# Patient Record
Sex: FEMALE | Race: WHITE | NOT HISPANIC OR LATINO | Employment: FULL TIME | URBAN - METROPOLITAN AREA
[De-identification: names, ages, dates, MRNs, and addresses within clinical notes are randomized per-mention and may not be internally consistent; named-entity substitution may affect disease eponyms.]

---

## 2017-01-13 ENCOUNTER — ALLSCRIPTS OFFICE VISIT (OUTPATIENT)
Dept: OTHER | Facility: OTHER | Age: 22
End: 2017-01-13

## 2018-01-12 VITALS
WEIGHT: 184.5 LBS | DIASTOLIC BLOOD PRESSURE: 72 MMHG | HEIGHT: 67 IN | BODY MASS INDEX: 28.96 KG/M2 | SYSTOLIC BLOOD PRESSURE: 100 MMHG | RESPIRATION RATE: 14 BRPM | HEART RATE: 72 BPM

## 2018-02-16 DIAGNOSIS — Z78.9 USES BIRTH CONTROL: Primary | ICD-10-CM

## 2018-02-16 RX ORDER — NORETHINDRONE ACETATE AND ETHINYL ESTRADIOL AND FERROUS FUMARATE 1MG-20(21)
KIT ORAL
Qty: 84 TABLET | Refills: 3 | Status: SHIPPED | OUTPATIENT
Start: 2018-02-16 | End: 2019-02-15 | Stop reason: SDUPTHER

## 2018-07-20 ENCOUNTER — OFFICE VISIT (OUTPATIENT)
Dept: FAMILY MEDICINE CLINIC | Facility: CLINIC | Age: 23
End: 2018-07-20
Payer: COMMERCIAL

## 2018-07-20 VITALS
BODY MASS INDEX: 31.14 KG/M2 | RESPIRATION RATE: 16 BRPM | HEART RATE: 60 BPM | TEMPERATURE: 98 F | DIASTOLIC BLOOD PRESSURE: 72 MMHG | SYSTOLIC BLOOD PRESSURE: 110 MMHG | HEIGHT: 67 IN | WEIGHT: 198.4 LBS

## 2018-07-20 DIAGNOSIS — R30.0 DYSURIA: Primary | ICD-10-CM

## 2018-07-20 DIAGNOSIS — N89.8 VAGINAL DISCHARGE: ICD-10-CM

## 2018-07-20 DIAGNOSIS — N76.0 ACUTE VAGINITIS: ICD-10-CM

## 2018-07-20 LAB
SL AMB  POCT GLUCOSE, UA: NORMAL
SL AMB LEUKOCYTE ESTERASE,UA: NORMAL
SL AMB POCT BILIRUBIN,UA: NORMAL
SL AMB POCT BLOOD,UA: NORMAL
SL AMB POCT CLARITY,UA: NORMAL
SL AMB POCT COLOR,UA: YELLOW
SL AMB POCT KETONES,UA: NORMAL
SL AMB POCT NITRITE,UA: NORMAL
SL AMB POCT PH,UA: 7.5
SL AMB POCT SPECIFIC GRAVITY,UA: 1.01
SL AMB POCT URINE PROTEIN: 30
SL AMB POCT UROBILINOGEN: 0.2

## 2018-07-20 PROCEDURE — 99213 OFFICE O/P EST LOW 20 MIN: CPT | Performed by: FAMILY MEDICINE

## 2018-07-20 PROCEDURE — 3008F BODY MASS INDEX DOCD: CPT | Performed by: FAMILY MEDICINE

## 2018-07-20 PROCEDURE — 81002 URINALYSIS NONAUTO W/O SCOPE: CPT | Performed by: FAMILY MEDICINE

## 2018-07-20 RX ORDER — NORGESTIMATE AND ETHINYL ESTRADIOL 7DAYSX3 28
1 KIT ORAL DAILY
COMMUNITY
Start: 2017-01-30 | End: 2018-07-20 | Stop reason: ALTCHOICE

## 2018-07-23 LAB
C TRACH RRNA SPEC QL NAA+PROBE: NOT DETECTED
N GONORRHOEA RRNA SPEC QL NAA+PROBE: NOT DETECTED

## 2018-08-09 ENCOUNTER — ANNUAL EXAM (OUTPATIENT)
Dept: FAMILY MEDICINE CLINIC | Facility: CLINIC | Age: 23
End: 2018-08-09
Payer: COMMERCIAL

## 2018-08-09 VITALS
WEIGHT: 197.4 LBS | SYSTOLIC BLOOD PRESSURE: 118 MMHG | BODY MASS INDEX: 30.98 KG/M2 | DIASTOLIC BLOOD PRESSURE: 75 MMHG | HEIGHT: 67 IN | RESPIRATION RATE: 17 BRPM | HEART RATE: 69 BPM

## 2018-08-09 DIAGNOSIS — Z01.419 ENCOUNTER FOR GYNECOLOGICAL EXAMINATION WITH PAPANICOLAOU SMEAR OF CERVIX: Primary | ICD-10-CM

## 2018-08-09 PROCEDURE — 99395 PREV VISIT EST AGE 18-39: CPT | Performed by: PHYSICIAN ASSISTANT

## 2018-08-09 NOTE — PROGRESS NOTES
Assessment /Plan     1  Encounter for gynecological examination with Papanicolaou smear of cervix    - PAP done if normal will repeat in 3 years, c/w OCP daily  Encouraged healthy diet, exercise, monthly SBE  - Thinprep Pap    F/u 1 year for physical or sooner if needed    Subjective    Chief Complaint   Patient presents with   Ash[facility] Gynecologic Exam          Raymond Arguello is a 26 y o  female who presents for annual exam     General Health: The patient's health since the last visit is described as good  She has regular dental visits  She denies vision problems  She denies hearing loss  Immunizations status: up to date  Lifestyle:  She consumes a diverse and healthy diet  She exercises regularly  She does not use tobacco, drugs  Social alcohol  Reproductive health: Menstrual history: LMP: last week July, on OCP, cycles are regular every 28 days, normal flow, Sexually active, contraception: OCP no complaints  Pregnancy History:      Review of Systems    Constitutional: No fever, no chills, feels well, no tiredness, no recent weight gain or weight loss  Eyes: No complaints of eye pain, no red eyes, no eyesight problems, no discharge, no dry eyes, no itching of eyes  ENT: no complaints of earache, no loss of hearing, no nose bleeds, no nasal discharge, no sore throat, no hoarseness  Cardiovascular: No complaints of slow heart rate, no fast heart rate, no chest pain, no palpitations, no leg claudication, no lower extremity edema  Respiratory: No complaints of shortness of breath, no wheezing, no cough, no SOB on exertion, no orthopnea, no PND  Gastrointestinal: No complaints of abdominal pain, no constipation, no nausea or vomiting, no diarrhea, no bloody stools  Genitourinary: No complaints of dysuria, no incontinence, no pelvic pain, no dysmenorrhea, no vaginal discharge or bleeding     Musculoskeletal: No complaints of arthralgias, no myalgias, no joint swelling or stiffness, no limb pain or swelling  Integumentary: No complaints of skin rash or lesions, no itching, no skin wounds, no breast pain or lump  Neurological: No complaints of headache, no confusion, no convulsions, no numbness, no dizziness or fainting, no tingling, no limb weakness, no difficulty walking  Psychiatric: Not suicidal, no sleep disturbance, no anxiety or depression, no change in personality, no emotional problems  Endocrine: No complaints of proptosis, no hot flashes, no muscle weakness, no deepening of the voice, no feelings of weakness  Hematologic/Lymphatic: No complaints of swollen glands, no swollen glands in the neck, does not bleed easily, does not bruise easily  History reviewed  No pertinent past medical history  History reviewed  No pertinent surgical history  Family History   Problem Relation Age of Onset    No Known Problems Mother     No Known Problems Father     Substance Abuse Neg Hx     Mental illness Neg Hx     Alcohol abuse Neg Hx      Social History     Physical Exam    Vitals:    08/09/18 0958   BP: 118/75   BP Location: Left arm   Patient Position: Sitting   Cuff Size: Standard   Pulse: 69   Resp: 17   Weight: 89 5 kg (197 lb 6 4 oz)   Height: 5' 7 25" (1 708 m)       Constitutional   General appearance: No acute distress, well appearing and well nourished  Head and Face   Head and face: Normal     Neck   Neck: Supple, symmetric, trachea midline, no masses  Thyroid: Normal, no thyromegaly  Pulmonary   Respiratory effort: No increased work of breathing or signs of respiratory distress  Palpation of chest: Normal     Auscultation of lungs: Clear to auscultation  Cardiovascular   Palpation of heart: Normal PMI, no thrills  Auscultation of heart: Normal rate and rhythm, normal S1 and S2, no murmurs  Pedal pulses: 2+ bilaterally  Examination of extremities for edema and/or varicosities: Normal     Chest   Breasts: Normal, no dimpling or skin changes appreciated  Palpation of breasts and axillae: Normal, no masses palpated  Chest: Normal     Abdomen   Abdomen: Non-tender, no masses  Liver and spleen: No hepatomegaly or splenomegaly  Genitourinary   External genitalia and vagina: Normal, no lesions appreciated  Cervix: Normal, no lesions, Pap obtained  Uterus: Normal size, no tenderness, no masses  Adnexa/Parametria: Normal, no masses or tenderness  Lymphatic   Palpation of lymph nodes in neck: No lymphadenopathy  Palpation of lymph nodes in axillae: No lymphadenopathy  Palpation of lymph nodes in groin: No lymphadenopathy  Musculoskeletal   Gait and station: Normal     Skin   Skin and subcutaneous tissue: Normal without rashes or lesions  Palpation of skin and subcutaneous tissue: Normal turgor  Neurologic   Cranial nerves: Cranial nerves II-XII intact  Reflexes: 2+ and symmetric      Psychiatric   Judgment and insight: Normal     Mood and affect: Normal

## 2018-08-15 LAB
CLINICAL INFO: NORMAL
DATE PREVIOUS BX: NORMAL
LMP START DATE: NORMAL
SL AMB PREV. PAP:: NORMAL
SPECIMEN SOURCE CVX/VAG CYTO: NORMAL

## 2019-02-15 DIAGNOSIS — Z78.9 USES BIRTH CONTROL: ICD-10-CM

## 2019-02-15 RX ORDER — NORETHINDRONE ACETATE AND ETHINYL ESTRADIOL AND FERROUS FUMARATE 1MG-20(21)
KIT ORAL
Qty: 84 TABLET | Refills: 3 | Status: SHIPPED | OUTPATIENT
Start: 2019-02-15 | End: 2019-10-01 | Stop reason: ALTCHOICE

## 2019-02-18 ENCOUNTER — APPOINTMENT (OUTPATIENT)
Dept: URGENT CARE | Facility: CLINIC | Age: 24
End: 2019-02-18

## 2019-02-18 DIAGNOSIS — Z02.1 PHYSICAL EXAM, PRE-EMPLOYMENT: Primary | ICD-10-CM

## 2019-02-18 LAB — RUBV IGG SERPL IA-ACNC: 5.8 IU/ML

## 2019-02-18 PROCEDURE — 86765 RUBEOLA ANTIBODY: CPT | Performed by: PHYSICIAN ASSISTANT

## 2019-02-18 PROCEDURE — 86762 RUBELLA ANTIBODY: CPT | Performed by: PHYSICIAN ASSISTANT

## 2019-02-18 PROCEDURE — 86480 TB TEST CELL IMMUN MEASURE: CPT | Performed by: PHYSICIAN ASSISTANT

## 2019-02-18 PROCEDURE — 86735 MUMPS ANTIBODY: CPT | Performed by: PHYSICIAN ASSISTANT

## 2019-02-18 PROCEDURE — 86787 VARICELLA-ZOSTER ANTIBODY: CPT | Performed by: PHYSICIAN ASSISTANT

## 2019-02-19 LAB
MEV IGG SER QL: NORMAL
MUV IGG SER QL: NORMAL
VZV IGG SER IA-ACNC: NORMAL

## 2019-02-20 LAB
GAMMA INTERFERON BACKGROUND BLD IA-ACNC: 0.07 IU/ML
M TB IFN-G BLD-IMP: NEGATIVE
M TB IFN-G CD4+ BCKGRND COR BLD-ACNC: -0.01 IU/ML
M TB IFN-G CD4+ BCKGRND COR BLD-ACNC: -0.03 IU/ML
MITOGEN IGNF BCKGRD COR BLD-ACNC: >10 IU/ML

## 2019-07-08 RX ORDER — TRIAMCINOLONE ACETONIDE 1 MG/G
CREAM TOPICAL
Refills: 1 | COMMUNITY
Start: 2019-05-15 | End: 2019-07-10 | Stop reason: ALTCHOICE

## 2019-07-10 ENCOUNTER — OFFICE VISIT (OUTPATIENT)
Dept: FAMILY MEDICINE CLINIC | Facility: CLINIC | Age: 24
End: 2019-07-10
Payer: COMMERCIAL

## 2019-07-10 VITALS
WEIGHT: 200.1 LBS | HEIGHT: 67 IN | SYSTOLIC BLOOD PRESSURE: 114 MMHG | HEART RATE: 84 BPM | DIASTOLIC BLOOD PRESSURE: 76 MMHG | BODY MASS INDEX: 31.4 KG/M2 | RESPIRATION RATE: 15 BRPM

## 2019-07-10 DIAGNOSIS — Z30.09 BIRTH CONTROL COUNSELING: ICD-10-CM

## 2019-07-10 DIAGNOSIS — M25.552 PAIN OF BOTH HIP JOINTS: Primary | ICD-10-CM

## 2019-07-10 DIAGNOSIS — M25.551 PAIN OF BOTH HIP JOINTS: Primary | ICD-10-CM

## 2019-07-10 PROCEDURE — 99213 OFFICE O/P EST LOW 20 MIN: CPT | Performed by: PHYSICIAN ASSISTANT

## 2019-07-10 NOTE — PATIENT INSTRUCTIONS
Obesity   AMBULATORY CARE:   Obesity  is when your body mass index (BMI) is greater than 30  Your healthcare provider will use your height and weight to measure your BMI  The risks of obesity include  many health problems, such as injuries or physical disability  You may need tests to check for the following:  · Diabetes     · High blood pressure or high cholesterol     · Heart disease     · Gallbladder or liver disease     · Cancer of the colon, breast, prostate, liver, or kidney     · Sleep apnea     · Arthritis or gout  Seek care immediately if:   · You have a severe headache, confusion, or difficulty speaking  · You have weakness on one side of your body  · You have chest pain, sweating, or shortness of breath  Contact your healthcare provider if:   · You have symptoms of gallbladder or liver disease, such as pain in your upper abdomen  · You have knee or hip pain and discomfort while walking  · You have symptoms of diabetes, such as intense hunger and thirst, and frequent urination  · You have symptoms of sleep apnea, such as snoring or daytime sleepiness  · You have questions or concerns about your condition or care  Treatment for obesity  focuses on helping you lose weight to improve your health  Even a small decrease in BMI can reduce the risk for many health problems  Your healthcare provider will help you set a weight-loss goal   · Lifestyle changes  are the first step in treating obesity  These include making healthy food choices and getting regular physical activity  Your healthcare provider may suggest a weight-loss program that involves coaching, education, and therapy  · Medicine  may help you lose weight when it is used with a healthy diet and physical activity  · Surgery  can help you lose weight if you are very obese and have other health problems  There are several types of weight-loss surgery  Ask your healthcare provider for more information    Be successful losing weight:   · Set small, realistic goals  An example of a small goal is to walk for 20 minutes 5 days a week  Anther goal is to lose 5% of your body weight  · Tell friends, family members, and coworkers about your goals  and ask for their support  Ask a friend to lose weight with you, or join a weight-loss support group  · Identify foods or triggers that may cause you to overeat , and find ways to avoid them  Remove tempting high-calorie foods from your home and workplace  Place a bowl of fresh fruit on your kitchen counter  If stress causes you to eat, then find other ways to cope with stress  · Keep a diary to track what you eat and drink  Also write down how many minutes of physical activity you do each day  Weigh yourself once a week and record it in your diary  Eating changes: You will need to eat 500 to 1,000 fewer calories each day than you currently eat to lose 1 to 2 pounds a week  The following changes will help you cut calories:  · Eat smaller portions  Use small plates, no larger than 9 inches in diameter  Fill your plate half full of fruits and vegetables  Measure your food using measuring cups until you know what a serving size looks like  · Eat 3 meals and 1 or 2 snacks each day  Plan your meals in advance  Lorena Calvert and eat at home most of the time  Eat slowly  · Eat fruits and vegetables at every meal   They are low in calories and high in fiber, which makes you feel full  Do not add butter, margarine, or cream sauce to vegetables  Use herbs to season steamed vegetables  · Eat less fat and fewer fried foods  Eat more baked or grilled chicken and fish  These protein sources are lower in calories and fat than red meat  Limit fast food  Dress your salads with olive oil and vinegar instead of bottled dressing  · Limit the amount of sugar you eat  Do not drink sugary beverages  Limit alcohol  Activity changes:  Physical activity is good for your body in many ways   It helps you burn calories and build strong muscles  It decreases stress and depression, and improves your mood  It can also help you sleep better  Talk to your healthcare provider before you begin an exercise program   · Exercise for at least 30 minutes 5 days a week  Start slowly  Set aside time each day for physical activity that you enjoy and that is convenient for you  It is best to do both weight training and an activity that increases your heart rate, such as walking, bicycling, or swimming  · Find ways to be more active  Do yard work and housecleaning  Walk up the stairs instead of using elevators  Spend your leisure time going to events that require walking, such as outdoor festivals or fairs  This extra physical activity can help you lose weight and keep it off  Follow up with your healthcare provider as directed: You may need to meet with a dietitian  Write down your questions so you remember to ask them during your visits  © 2017 [employer] Information is for End User's use only and may not be sold, redistributed or otherwise used for commercial purposes  All illustrations and images included in CareNotes® are the copyrighted property of [employer]  or [employer]  The above information is an  only  It is not intended as medical advice for individual conditions or treatments  Talk to your doctor, nurse or pharmacist before following any medical regimen to see if it is safe and effective for you

## 2019-07-10 NOTE — PROGRESS NOTES
Assessment/Plan:    1  Pain of both hip joints    - refer to [facility] Dr Hannah Roe for eval and treatment  - Ambulatory referral to Orthopedic Surgery; Future    2  Birth control counseling    - refer to Dr Caleb Barboza to discuss nonhormonal IUD  - Ambulatory referral to Obstetrics / Gynecology; Future    3  Obesity    - encouraged patient to work on [employer], exercise  and adequate sleep for weight loss  Follow up if more help is needed      F/u as needed    Subjective:   Chief Complaint   Patient presents with    Contraception      Patient ID: Paloma Ortiz is a 22 y o  female  Patient here c/o left hip pain getting worse, while at work notes they are constant clicking off and on but most recently the L felt like it dislocated with sharp  If patient is working all day hips will be so sore at night pain with sitting  Has had pain for 3 months, soreness about a year  No trauma, no change in exercise  Aleve does help  Patient notes for the past year on her OCP has been "blue"  Notes the three weeks she takes hormones is blue and when off the week for her period "life it good"  Feels this happened with her last pill and needed a change  Would liek to discuss options  Is sexually active, monogamous relationship with one male  The following portions of the patient's history were reviewed and updated as appropriate: allergies, current medications, past family history, past medical history, past social history, past surgical history and problem list     History reviewed  No pertinent past medical history  History reviewed  No pertinent surgical history    Family History   Problem Relation Age of Onset    No Known Problems Mother     No Known Problems Father     Substance Abuse Neg Hx     Mental illness Neg Hx     Alcohol abuse Neg Hx      Social History     Socioeconomic History    Marital status: Single     Spouse name: Not on file    Number of children: Not on file    Years of education: Not on file  Highest education level: Not on file   Occupational History    Not on file   Social Needs    Financial resource strain: Not on file    Food insecurity:     Worry: Not on file     Inability: Not on file    Transportation needs:     Medical: Not on file     Non-medical: Not on file   Tobacco Use    Smoking status: Never Smoker    Smokeless tobacco: Never Used   Substance and Sexual Activity    Alcohol use: No    Drug use: No    Sexual activity: Not on file   Lifestyle    Physical activity:     Days per week: Not on file     Minutes per session: Not on file    Stress: Not on file   Relationships    Social connections:     Talks on phone: Not on file     Gets together: Not on file     Attends [employer] service: Not on file     Active member of club or organization: Not on file     Attends meetings of clubs or organizations: Not on file     Relationship status: Not on file    Intimate partner violence:     Fear of current or ex partner: Not on file     Emotionally abused: Not on file     Physically abused: Not on file     Forced sexual activity: Not on file   Other Topics Concern    Not on file   Social History Narrative    Not on file       Current Outpatient Medications:     JUNEL FE 1/20 1-20 MG-MCG per tablet, TAKE 1 TABLET DAILY AS DIRECTED , Disp: 84 tablet, Rfl: 3    Review of Systems          Objective:    Vitals:    07/10/19 1056   BP: 114/76   BP Location: Left arm   Patient Position: Sitting   Cuff Size: Standard   Pulse: 84   Resp: 15   Weight: 90 8 kg (200 lb 1 6 oz)   Height: 5' 7 25" (1 708 m)        Physical Exam   Constitutional: She is oriented to person, place, and time  She appears well-developed and well-nourished  Cardiovascular: Normal rate, regular rhythm and normal heart sounds  Pulmonary/Chest: Effort normal and breath sounds normal    Neurological: She is alert and oriented to person, place, and time  Skin: Skin is warm  Psychiatric: She has a normal mood and affect  BMI Counseling: Body mass index is 31 11 kg/m²  Discussed the patient's BMI with her  The BMI is above average  BMI counseling and education was provided to the patient  Nutrition recommendations include reducing portion sizes, decreasing overall calorie intake, 3-5 servings of fruits/vegetables daily and reducing fast food intake

## 2019-07-16 ENCOUNTER — TELEPHONE (OUTPATIENT)
Dept: OBGYN CLINIC | Facility: CLINIC | Age: 24
End: 2019-07-16

## 2019-07-16 ENCOUNTER — OFFICE VISIT (OUTPATIENT)
Dept: OBGYN CLINIC | Facility: CLINIC | Age: 24
End: 2019-07-16
Payer: COMMERCIAL

## 2019-07-16 VITALS
HEIGHT: 67 IN | DIASTOLIC BLOOD PRESSURE: 72 MMHG | WEIGHT: 197.2 LBS | BODY MASS INDEX: 30.95 KG/M2 | SYSTOLIC BLOOD PRESSURE: 112 MMHG

## 2019-07-16 DIAGNOSIS — Z11.3 SCREEN FOR STD (SEXUALLY TRANSMITTED DISEASE): ICD-10-CM

## 2019-07-16 DIAGNOSIS — Z30.014 ENCOUNTER FOR INITIAL PRESCRIPTION OF INTRAUTERINE CONTRACEPTIVE DEVICE (IUD): Primary | ICD-10-CM

## 2019-07-16 PROCEDURE — 87591 N.GONORRHOEAE DNA AMP PROB: CPT | Performed by: OBSTETRICS & GYNECOLOGY

## 2019-07-16 PROCEDURE — 99203 OFFICE O/P NEW LOW 30 MIN: CPT | Performed by: OBSTETRICS & GYNECOLOGY

## 2019-07-16 PROCEDURE — 87491 CHLMYD TRACH DNA AMP PROBE: CPT | Performed by: OBSTETRICS & GYNECOLOGY

## 2019-07-16 NOTE — PROGRESS NOTES
Pt is here to discuss switching from OCPs to an IUD  She feels her OCPs are contributing to mood swings and would like to stop taking them

## 2019-07-17 ENCOUNTER — TELEPHONE (OUTPATIENT)
Dept: OBGYN CLINIC | Facility: CLINIC | Age: 24
End: 2019-07-17

## 2019-07-17 LAB
C TRACH DNA SPEC QL NAA+PROBE: NEGATIVE
N GONORRHOEA DNA SPEC QL NAA+PROBE: NEGATIVE

## 2019-07-17 NOTE — TELEPHONE ENCOUNTER
Alonso and gregg [facility] labeled and will be brought to CV office  Pt can be scheduled for insertion

## 2019-07-17 NOTE — PROGRESS NOTES
Assessment     22 y o , starting IUD, no contraindications  Time spent counseling greater than 30 minutes    Plan: We reviewed all available birth control besides OCP  Given PMDD-type symptoms only on OCP I feel an IUD is her best option  We discussed Kyleena vs  Paragard  Pregnancy, infection, mechanical complications, and side effects of both IUD's reviewed  GC/chlamydia collected from urine  Pamphlets given    Subjective      Adelina Priest is a 22 y o  female who presents for contraception counseling  The patient has no complaints today  The patient is sexually active  She has been on OCP's for 6 years and has been experiencing persistent problems with PMDD-type symptoms  No BTB  Denies history of migraines, VTE, liver disease  She is not a smoker  Menstrual History:  OB History        0    Para   0    Term   0       0    AB   0    Living   0       SAB   0    TAB   0    Ectopic   0    Multiple   0    Live Births   0                  Patient's last menstrual period was 2019 (exact date)  Period Cycle (Days): 27  Period Duration (Days): 4  Period Pattern: Regular  Menstrual Flow: Moderate  Dysmenorrhea: (!) Mild  Dysmenorrhea Symptoms: Cramping    History reviewed  No pertinent past medical history  History reviewed  No pertinent surgical history        Current Outpatient Medications:     JUNEL FE 1/20 1-20 MG-MCG per tablet, TAKE 1 TABLET DAILY AS DIRECTED , Disp: 84 tablet, Rfl: 3    Allergies   Allergen Reactions    Pollen Extract        Social History     Tobacco Use    Smoking status: Never Smoker    Smokeless tobacco: Never Used   Substance Use Topics    Alcohol use: Yes     Frequency: Monthly or less    Drug use: No       Family History   Problem Relation Age of Onset    No Known Problems Mother     No Known Problems Father     Substance Abuse Neg Hx     Mental illness Neg Hx     Alcohol abuse Neg Hx      Review of Systems   Constitution: Positive for weight gain  Negative for decreased appetite, malaise/fatigue and night sweats  Gastrointestinal: Negative for bloating, abdominal pain, nausea and vomiting  Genitourinary: Negative for frequency, menorrhagia, missed menses and pelvic pain  Neurological: Negative for dizziness, headaches, light-headedness and seizures  Objective      /72 (BP Location: Right arm, Patient Position: Sitting, Cuff Size: Large)   Ht 5' 7" (1 702 m)   Wt 89 4 kg (197 lb 3 2 oz)   LMP 06/19/2019 (Exact Date)   Breastfeeding? No   BMI 30 89 kg/m²     Physical Exam   Constitutional: She is oriented to person, place, and time  She appears well-developed and well-nourished  HENT:   Head: Normocephalic  Cardiovascular: Normal rate  Pulmonary/Chest: Effort normal    Musculoskeletal: She exhibits no edema  Neurological: She is alert and oriented to person, place, and time  Skin: Skin is warm and dry  Psychiatric: She has a normal mood and affect  Vitals reviewed

## 2019-08-20 ENCOUNTER — OFFICE VISIT (OUTPATIENT)
Dept: OBGYN CLINIC | Facility: CLINIC | Age: 24
End: 2019-08-20
Payer: COMMERCIAL

## 2019-08-20 VITALS — DIASTOLIC BLOOD PRESSURE: 62 MMHG | SYSTOLIC BLOOD PRESSURE: 102 MMHG | BODY MASS INDEX: 31.42 KG/M2 | WEIGHT: 200.6 LBS

## 2019-08-20 DIAGNOSIS — Z32.02 PREGNANCY TEST NEGATIVE: ICD-10-CM

## 2019-08-20 DIAGNOSIS — Z30.430 ENCOUNTER FOR INSERTION OF INTRAUTERINE CONTRACEPTIVE DEVICE (IUD): Primary | ICD-10-CM

## 2019-08-20 LAB — SL AMB POCT URINE HCG: NORMAL

## 2019-08-20 PROCEDURE — 58300 INSERT INTRAUTERINE DEVICE: CPT | Performed by: OBSTETRICS & GYNECOLOGY

## 2019-08-20 PROCEDURE — 81025 URINE PREGNANCY TEST: CPT | Performed by: OBSTETRICS & GYNECOLOGY

## 2019-08-20 NOTE — PROGRESS NOTES
Iud insertions  Date/Time: 8/20/2019 7:41 AM  Performed by: George Schlatter, MD  Authorized by: George Schlatter, MD     Consent:     Consent obtained:  Written    Consent given by:  Patient    Procedure risks and benefits discussed: yes      Patient questions answered: yes      Patient agrees, verbalizes understanding, and wants to proceed: yes      Educational handouts given: yes      Instructions and paperwork completed: yes    Procedure:     Pelvic exam performed: yes      Negative GC/chlamydia test: yes      Negative urine pregnancy test: yes      Cervix cleaned and prepped: yes      Speculum placed in vagina: yes      Tenaculum applied to cervix: yes      Uterus sounded: yes      Uterus sound depth (cm):  8    IUD inserted with no complications: yes      IUD type: Emili Ashkan      Strings trimmed: yes    Post-procedure:     Patient tolerated procedure well: yes      Patient will follow up after next period: yes

## 2019-09-20 ENCOUNTER — EVALUATION (OUTPATIENT)
Dept: PHYSICAL THERAPY | Facility: CLINIC | Age: 24
End: 2019-09-20
Payer: COMMERCIAL

## 2019-09-20 ENCOUNTER — TRANSCRIBE ORDERS (OUTPATIENT)
Dept: PHYSICAL THERAPY | Facility: CLINIC | Age: 24
End: 2019-09-20

## 2019-09-20 DIAGNOSIS — M25.551 HIP PAIN, BILATERAL: Primary | ICD-10-CM

## 2019-09-20 DIAGNOSIS — M25.552 HIP PAIN, BILATERAL: Primary | ICD-10-CM

## 2019-09-20 PROCEDURE — 97161 PT EVAL LOW COMPLEX 20 MIN: CPT | Performed by: PHYSICAL THERAPIST

## 2019-09-20 NOTE — PROGRESS NOTES
PT Evaluation     Today's date: 2019  Patient name: Eddie Yap  : 1995  MRN: [id number]  Referring provider: Mackie Mortimer, DO  Dx:   Encounter Diagnosis     ICD-10-CM    1  Hip pain, bilateral M25 551     M25 552                   Assessment  Assessment details: Pt is a 22 y o  Female with a diagnosis of bilateral snapping hip  Clinical findings indicate poor balance and ambulation mechanics, tight iliopsoas, and global hip weakness  Pt would benefit from outpatient physical therapy to treat these impairments, and the below functional limitations  Impairments: abnormal coordination, abnormal movement, impaired balance, impaired physical strength, lacks appropriate home exercise program, pain with function and poor body mechanics  Functional limitations: pain with ambulating, pain with hills, pain with sitting long periods of time, pain with running  Goals  STG  1  [place] with HEP  2  Pt will decrease pain by 2 levels to improve quality of life  3  Pt will perform a negative Desmond test to indicate decreased iliopsoas tightness  LTG  1  Pt will tolerate running 10 mins without pain to reach pt goal of return to activity and PLOF  2  Pt will increase global hip strength to 5/5 to indicate increased stability surrounding the hip  3  Pt will obtain orthotics to improve body mechanics during ambulation  Plan  Patient would benefit from: skilled physical therapy  Planned therapy interventions: strengthening, stretching, therapeutic activities, therapeutic exercise, therapeutic training and home exercise program  Frequency: 2x week  Duration in weeks: 6  Treatment plan discussed with: patient        Subjective Evaluation    History of Present Illness  Mechanism of injury: Pt presents with snapping hip bilaterally  Snapping is both audible and felt by pt  Pain has been persisting for a few years, and unchanging  Pt referred from ortho   Pain inconsistently reproduced with walking and sitting for long periods of time, and ambulating hills  Pain alleviated with medication  Sleep is currently uninterrupted, but sometimes difficulty falling asleep due to pain  Pt attributes pain starting years ago due to increased walking at school with lots of hills  Xrays appear normal  No radiating pain present beyond the hip  Pt reports occasional knee pain associated with episodes of severe hip pain  Pain  Current pain ratin  At best pain ratin  At worst pain ratin  Location: Anterior hip bilaterally  Quality: dull ache and throbbing  Relieving factors: medications and rest  Aggravating factors: running, standing, walking and sitting  Progression: no change    Social Support    Employment status: working ()  Exercise history: running, spinning      Diagnostic Tests  X-ray: normal  Patient Goals  Patient goals for therapy: return to sport/leisure activities and decreased pain  Patient goal: return to running without pain        Objective     Static Posture     Knee   Genu valgus  Ankle/Foot   Ankle/Foot (Left): Pronated  Ankle/Foot (Right): Pronated  Palpation   Left   Tenderness of the iliopsoas  Right   Tenderness of the iliopsoas  Active Range of Motion   Left Hip   Flexion: WFL and with pain  Extension: WFL  Abduction: WFL    Right Hip   Flexion: WFL and with pain  Extension: WFL  Abduction: WFL    Passive Range of Motion   Left Hip   Flexion: WFL and with pain  Abduction: WFL  External rotation (90/90): WFL and with pain  Internal rotation (90/90): WFL    Right Hip   Flexion: WFL  Abduction: WFL  External rotation (90/90): WFL and with pain  Internal rotation (90/90): WFL  Left Knee   Normal passive range of motion    Right Knee   Normal passive range of motion    Additional Passive Range of Motion Details  No piriformis tightness present (normal ROM)    Joint Play   Left Hip   Joints within functional limits are the long axis distraction       Additional Joint Play Details  Long axis distraction closed pack position: WFL B/L    Strength/Myotome Testing     Left Hip   Planes of Motion   Flexion: 3+  Extension: 4+  Abduction: 4-  Adduction: WFL  External rotation: 4  Internal rotation: 4    Isolated Muscles   Gluteus stacey: 4+    Right Hip   Planes of Motion   Flexion: 3+  Extension: 4+  Abduction: 4-  Adduction: WFL  External rotation: 4  Internal rotation: 4    Isolated Muscles   Gluteus maximums: 4+    Left Knee   Flexion: 5  Extension: 5    Right Knee   Flexion: 5  Extension: 5    Left Ankle/Foot   Dorsiflexion: 5    Right Ankle/Foot   Dorsiflexion: 5    Additional Strength Details  Pain with ER B/L    Tests     Left Hip   Positive Ely's  Negative SUSIE, FADIR, modified Juan M, Juan M, piriformis, scour, SI compression and SI distraction  Desmond: Positive  90/90 SLR: Negative  Right Hip   Positive Ely's  Negative SUSIE, FADIR, modified Juan M, Juan M, piriformis, scour, SI compression and SI distraction  Desmond: Positive  90/90 SLR: Negative      Ambulation     Observational Gait   Base of support: increased    Additional Observational Gait Details  Knee valgus bilaterally     Functional Assessment        Comments  Forward step down B/L  -trunk lean  -knee collapse  -arm swing  -unsteady     Single Leg Stance: Poor balance             Precautions: none      Manual  9/20            [place] tail to B/L ant hip                                                                     Exercise Diary  9/20            Supine hip flex stretch  Demo            SLR 3 ways             Bridges             Clamshells/reverse             Quad hip ext (kb)             Quad hip abd             Prone quad stretch w/ strap             Step ups (f/l)             Step downs             Toe taps on cones             Step ups on bosu             Squats             upsidedown Bosu squats             SLS on airex             Standing hip flex tband at ankle             [employer] PT student supervised by Fazal Castillo DPT

## 2019-09-20 NOTE — LETTER
2019    [id number] [facility]    Patient: Morena Mcneal   YOB: 1995   Date of Visit: 2019     Encounter Diagnosis     ICD-10-CM    1  Hip pain, bilateral M25 551     M25 552        Dear Dr Ortez Barley: Thank you for your recent referral of Jorge Cook  Please review the attached evaluation summary from Adelina's recent visit  Please verify that you agree with the plan of care by signing the attached order  If you have any questions or concerns, please do not hesitate to call  I sincerely appreciate the opportunity to share in the care of one of your patients and hope to have another opportunity to work with you in the near future  Sincerely,    Roz Barajas, PT      Referring Provider:      I certify that I have read the below Plan of Care and certify the need for these services furnished under this plan of treatment while under my care  Daniel [address]: [phone]          PT Evaluation     Today's date: 2019  Patient name: Jorge Cook  : 1995  MRN: [id number]  Referring provider: Carmen Restrepo DO  Dx:   Encounter Diagnosis     ICD-10-CM    1  Hip pain, bilateral M25 551     M25 552                   Assessment  Assessment details: Pt is a 22 y o  Female with a diagnosis of bilateral snapping hip  Clinical findings indicate poor balance and ambulation mechanics, tight iliopsoas, and global hip weakness  Pt would benefit from outpatient physical therapy to treat these impairments, and the below functional limitations     Impairments: abnormal coordination, abnormal movement, impaired balance, impaired physical strength, lacks appropriate home exercise program, pain with function and poor body mechanics  Functional limitations: pain with ambulating, pain with hills, pain with sitting long periods of time, pain with running  Goals  STG  1  [place] with HEP  2  Pt will decrease pain by 2 levels to improve quality of life  3  Pt will perform a negative Desmond test to indicate decreased iliopsoas tightness  LTG  1  Pt will tolerate running 10 mins without pain to reach pt goal of return to activity and PLOF  2  Pt will increase global hip strength to 5/5 to indicate increased stability surrounding the hip  3  Pt will obtain orthotics to improve body mechanics during ambulation  Plan  Patient would benefit from: skilled physical therapy  Planned therapy interventions: strengthening, stretching, therapeutic activities, therapeutic exercise, therapeutic training and home exercise program  Frequency: 2x week  Duration in weeks: 6  Treatment plan discussed with: patient        Subjective Evaluation    History of Present Illness  Mechanism of injury: Pt presents with snapping hip bilaterally  Snapping is both audible and felt by pt  Pain has been persisting for a few years, and unchanging  Pt referred from ortho  Pain inconsistently reproduced with walking and sitting for long periods of time, and ambulating hills  Pain alleviated with medication  Sleep is currently uninterrupted, but sometimes difficulty falling asleep due to pain  Pt attributes pain starting years ago due to increased walking at school with lots of hills  Xrays appear normal  No radiating pain present beyond the hip  Pt reports occasional knee pain associated with episodes of severe hip pain     Pain  Current pain ratin  At best pain ratin  At worst pain ratin  Location: Anterior hip bilaterally  Quality: dull ache and throbbing  Relieving factors: medications and rest  Aggravating factors: running, standing, walking and sitting  Progression: no change    Social Support    Employment status: working (personal )  Exercise history: running, spinning      Diagnostic Tests  X-ray: normal  Patient Goals  Patient goals for therapy: return to sport/leisure activities and decreased pain  Patient goal: return to running without pain        Objective     Static Posture     Knee   Genu valgus  Ankle/Foot   Ankle/Foot (Left): Pronated  Ankle/Foot (Right): Pronated  Palpation   Left   Tenderness of the iliopsoas  Right   Tenderness of the iliopsoas  Active Range of Motion   Left Hip   Flexion: WFL and with pain  Extension: WFL  Abduction: WFL    Right Hip   Flexion: WFL and with pain  Extension: WFL  Abduction: WFL    Passive Range of Motion   Left Hip   Flexion: WFL and with pain  Abduction: WFL  External rotation (90/90): WFL and with pain  Internal rotation (90/90): WFL    Right Hip   Flexion: WFL  Abduction: WFL  External rotation (90/90): WFL and with pain  Internal rotation (90/90): WFL  Left Knee   Normal passive range of motion    Right Knee   Normal passive range of motion    Additional Passive Range of Motion Details  No piriformis tightness present (normal ROM)    Joint Play   Left Hip   Joints within functional limits are the long axis distraction  Additional Joint Play Details  Long axis distraction closed pack position: WFL B/L    Strength/Myotome Testing     Left Hip   Planes of Motion   Flexion: 3+  Extension: 4+  Abduction: 4-  Adduction: WFL  External rotation: 4  Internal rotation: 4    Isolated Muscles   Gluteus stacey: 4+    Right Hip   Planes of Motion   Flexion: 3+  Extension: 4+  Abduction: 4-  Adduction: WFL  External rotation: 4  Internal rotation: 4    Isolated Muscles   Gluteus maximums: 4+    Left Knee   Flexion: 5  Extension: 5    Right Knee   Flexion: 5  Extension: 5    Left Ankle/Foot   Dorsiflexion: 5    Right Ankle/Foot   Dorsiflexion: 5    Additional Strength Details  Pain with ER B/L    Tests     Left Hip   Positive Ely's     Negative MELINDA RICHARDS, bryan Cantu piriformis, scour, SI compression and SI distraction  Desmond: Positive  90/90 SLR: Negative  Right Hip   Positive Ely's  Negative SUSIE, FADIR, modified Juan M, Juan M, piriformis, scour, SI compression and SI distraction  Desmond: Positive  90/90 SLR: Negative      Ambulation     Observational Gait   Base of support: increased    Additional Observational Gait Details  Knee valgus bilaterally     Functional Assessment        Comments  Forward step down B/L  -trunk lean  -knee collapse  -arm swing  -unsteady     Single Leg Stance: Poor balance             Precautions: none      Manual  9/20            [place] tail to B/L ant hip                                                                     Exercise Diary  9/20            Supine hip flex stretch  Demo            SLR 3 ways             Bridges             Clamshells/reverse             Quad hip ext (kb)             Quad hip abd             Prone quad stretch w/ strap             Step ups (f/l)             Step downs             Toe taps on cones             Step ups on bosu             Squats             upsidedown Bosu squats             SLS on airex             Standing hip flex tband at ankle             Bike                                                                     PT student supervised by Nirav Tracy DPT

## 2019-09-23 ENCOUNTER — OFFICE VISIT (OUTPATIENT)
Dept: PHYSICAL THERAPY | Facility: CLINIC | Age: 24
End: 2019-09-23
Payer: COMMERCIAL

## 2019-09-23 DIAGNOSIS — M25.552 HIP PAIN, BILATERAL: Primary | ICD-10-CM

## 2019-09-23 DIAGNOSIS — M25.551 HIP PAIN, BILATERAL: Primary | ICD-10-CM

## 2019-09-23 PROCEDURE — 97110 THERAPEUTIC EXERCISES: CPT | Performed by: PHYSICAL THERAPIST

## 2019-09-23 PROCEDURE — 97530 THERAPEUTIC ACTIVITIES: CPT | Performed by: PHYSICAL THERAPIST

## 2019-09-23 NOTE — PROGRESS NOTES
Daily Note     Today's date: 2019  Patient name: Cesar Banda  : 1995  MRN: [id number]  Referring provider: Arcenio Toro DO  Dx:   Encounter Diagnosis     ICD-10-CM    1  Hip pain, bilateral M25 551     M25 552                   Subjective: Pt reports no increase in symptoms or pain levels since last session  Reports feeling a lot of stretch with HEP hip flexor stretch, but is tolerable  Objective: See treatment diary below      Assessment: Pt tolerated treatment well  Will progress next visit and add more functional movements  Plan: Continue per plan of care        Precautions: none      Manual             [place] tail to B/L ant hip  8'                                                                   Exercise Diary             Bike  8'           Supine hip flex stretch  Demo 3x30" ea           SLR 3 ways  x15           Bridges  x15           Clamshells/reverse  GTB x15 ea           Quad hip ext (kb)  x15           Quad hip abd  x15           Prone quad stretch w/ strap  3x30" ea           Step ups (f/l)             Step downs             Toe taps on cones  x15 ea           Step ups on bosu             Squats             upsidedown Bosu squats             SLS on airex             Standing hip flex tband at ankle  x15 red                                                                   PT student supervised by Mikal Duvall DPT

## 2019-09-26 ENCOUNTER — OFFICE VISIT (OUTPATIENT)
Dept: PHYSICAL THERAPY | Facility: CLINIC | Age: 24
End: 2019-09-26
Payer: COMMERCIAL

## 2019-09-26 DIAGNOSIS — M25.552 HIP PAIN, BILATERAL: Primary | ICD-10-CM

## 2019-09-26 DIAGNOSIS — M25.551 HIP PAIN, BILATERAL: Primary | ICD-10-CM

## 2019-09-26 PROCEDURE — 97110 THERAPEUTIC EXERCISES: CPT | Performed by: PHYSICAL THERAPIST

## 2019-09-26 PROCEDURE — 97530 THERAPEUTIC ACTIVITIES: CPT | Performed by: PHYSICAL THERAPIST

## 2019-09-26 NOTE — PROGRESS NOTES
Daily Note     Today's date: 2019  Patient name: Carrie Villalobos  : 1995  MRN: [id number]  Referring provider: Ara Peck DO  Dx:   Encounter Diagnosis     ICD-10-CM    1  Hip pain, bilateral M25 551     M25 552                   Subjective: Pt reports decreased pain after last session  Pt also reports, "I feel a little sore today from walking all day yesterday "      Objective: See treatment diary below      Assessment: Pt tolerated treatment well  Pt reports decreased pain by the end of treatment, and feeling muscle fatigue from exercises  Plan: Continue per plan of care        Precautions: none      Manual            [place] tail to B/L ant hip  8' 8'                                                                  Exercise Diary            Bike  8' 8'          Supine hip flex stretch  Demo 3x30" ea 3x30" ea          SLR 3 ways  x15 x15          Bridges  x15 x15          Clamshells/reverse  GTB x15 ea GTB x15          Quad hip ext (kb)  x15 x15           Quad hip abd  x15 x15          Prone quad stretch w/ strap  3x30" ea 3x30" ea           Step ups (f/l)             Step downs             Toe taps on cones  x15 ea x15 ea          Step ups on bosu             Squats over mat   x15          upsidedown Bosu squats             SLS on airex             Standing hip flex tband at ankle  x15 red x15 red                                                                  PT student supervised by Amy Madrid DPT

## 2019-09-30 ENCOUNTER — OFFICE VISIT (OUTPATIENT)
Dept: PHYSICAL THERAPY | Facility: CLINIC | Age: 24
End: 2019-09-30
Payer: COMMERCIAL

## 2019-09-30 ENCOUNTER — APPOINTMENT (OUTPATIENT)
Dept: PHYSICAL THERAPY | Facility: CLINIC | Age: 24
End: 2019-09-30
Payer: COMMERCIAL

## 2019-09-30 DIAGNOSIS — M25.552 HIP PAIN, BILATERAL: Primary | ICD-10-CM

## 2019-09-30 DIAGNOSIS — M25.551 HIP PAIN, BILATERAL: Primary | ICD-10-CM

## 2019-09-30 PROCEDURE — 97110 THERAPEUTIC EXERCISES: CPT

## 2019-09-30 NOTE — PROGRESS NOTES
Daily Note     Today's date: 2019  Patient name: Johnathan Vu  : 1995  MRN: [id number]  Referring provider: Manfred Young DO  Dx:   Encounter Diagnosis     ICD-10-CM    1  Hip pain, bilateral M25 551     M25 552                   Subjective: "My L hip has been a pain in the ass " Notes L hip pain level is 6-6 5/10, R hip "is fine today," adding, "it was a little sore" after working out at the gym  Objective: See treatment diary below      Assessment: Discomfort initially quad ABD ex on L, performed remaining exercises w/o complaint  Responded well to tiger tail STM to B hip flexors  Relief after tx  Will monitor  Plan: Continue per plan of care        Precautions: none      Manual           [place] tail to B/L ant hip  8' 8' 8'                                                                 Exercise Diary           Bike  8' 8' 8'         Supine hip flex stretch  Demo 3x30" ea 3x30" ea 3x30"  ea         SLR 3 ways  x15 x15 x15         [facility]  x15 x15 x15         Clamshells/reverse  GTB x15 ea GTB x15 GTB  x15         Quad hip ext (kb)  x15 x15  x15         Quad hip abd  x15 x15 x15         Prone quad stretch w/ strap  3x30" ea 3x30" ea  3x30"  ea         Step ups (f/l)             Step downs             Toe taps on cones  x15 ea x15 ea x15ea         Step ups on bosu             Squats over mat   x15 x15         upsidedown Bosu squats             SLS on airex             Standing hip flex tband at ankle  x15 red x15 red x15  red

## 2019-10-01 ENCOUNTER — APPOINTMENT (OUTPATIENT)
Dept: PHYSICAL THERAPY | Facility: CLINIC | Age: 24
End: 2019-10-01
Payer: COMMERCIAL

## 2019-10-01 ENCOUNTER — OFFICE VISIT (OUTPATIENT)
Dept: OBGYN CLINIC | Facility: CLINIC | Age: 24
End: 2019-10-01
Payer: COMMERCIAL

## 2019-10-01 VITALS — SYSTOLIC BLOOD PRESSURE: 118 MMHG | BODY MASS INDEX: 31.17 KG/M2 | WEIGHT: 199 LBS | DIASTOLIC BLOOD PRESSURE: 74 MMHG

## 2019-10-01 DIAGNOSIS — Z30.431 IUD CHECK UP: Primary | ICD-10-CM

## 2019-10-01 PROCEDURE — 99213 OFFICE O/P EST LOW 20 MIN: CPT | Performed by: OBSTETRICS & GYNECOLOGY

## 2019-10-01 NOTE — PROGRESS NOTES
GYN Follow Up Visit    HPI:  Patient is here for IUD string check up  Only light spotting  No problems during intercourse  PMH, PSH, Meds, Allergies, SocHx, FamHx reviewed and no changes noted    Review of Systems   Constitution: Negative for chills, decreased appetite, fever and malaise/fatigue  Gastrointestinal: Negative for abdominal pain, anorexia, change in bowel habit and nausea  Genitourinary: Negative for dysuria, frequency, hesitancy and pelvic pain  Neurological: Negative for dizziness, headaches, light-headedness and weakness  Vitals:    10/01/19 0913   BP: 118/74       Physical Exam   Constitutional: She is oriented to person, place, and time  She appears well-developed and well-nourished  Genitourinary: Vagina normal and uterus normal  Right adnexum does not display tenderness  Left adnexum does not display tenderness  Cervix exhibits visible IUD strings  Cervix does not exhibit discharge  Uterus is not tender  HENT:   Head: Normocephalic  Cardiovascular: Normal rate  Pulmonary/Chest: Effort normal    Abdominal: Soft  There is no tenderness  Musculoskeletal: She exhibits no edema  Neurological: She is alert and oriented to person, place, and time  Skin: Skin is warm and dry  Psychiatric: She has a normal mood and affect  Vitals reviewed      Impression:  Normal IUD check up    Plan:  Follow up @ annual exam

## 2019-10-09 ENCOUNTER — OFFICE VISIT (OUTPATIENT)
Dept: PHYSICAL THERAPY | Facility: CLINIC | Age: 24
End: 2019-10-09
Payer: COMMERCIAL

## 2019-10-09 DIAGNOSIS — M25.551 HIP PAIN, BILATERAL: Primary | ICD-10-CM

## 2019-10-09 DIAGNOSIS — M25.552 HIP PAIN, BILATERAL: Primary | ICD-10-CM

## 2019-10-09 PROCEDURE — 97110 THERAPEUTIC EXERCISES: CPT

## 2019-10-09 NOTE — PROGRESS NOTES
Daily Note     Today's date: 10/9/2019  Patient name: Brunilda Payment  : 1995  MRN: [id number]  Referring provider: Ruby Chin DO  Dx:   Encounter Diagnosis     ICD-10-CM    1  Hip pain, bilateral M25 551     M25 552                   Subjective: "I was away for a week, and the flights were painful " Notes L hip pain level is 2/10 upon arrival to therapy  Objective: See treatment diary below      Assessment: Performed exercise program w/o difficulty or discomfort  Cont to respond well to tiger tail STM to B hip flexors  Relief after tx  Will monitor  Plan: Continue per plan of care        Precautions: none      Manual   10        [place] tail to B/L ant hip  8' 8' 8' 8'                                                                Exercise Diary  9/20 9/23 9/26 9/30 10/9        Bike  8' 8' 8' 8'        Supine hip flex stretch  Demo 3x30" ea 3x30" ea 3x30"  ea 3x30"  ea        SLR 3 ways  x15 x15 x15 x15        Bridges  x15 x15 x15 x15        Clamshells/reverse  GTB x15 ea GTB x15 GTB  x15 GTB  x15        Quad hip ext (kb)  x15 x15  x15 x15        Quad hip abd  x15 x15 x15 x15        Prone quad stretch w/ strap  3x30" ea 3x30" ea  3x30"  ea 3x30"  ea        Step ups (f/l)     6"x15  ea        Step downs             Toe taps on cones  x15 ea x15 ea x15ea x15ea        Step ups on bosu             Squats over mat   x15 x15 NP        upsidedown Bosu squats             SLS on airex             Standing hip flex tband at ankle  x15 red x15 red x15  red x15  red

## 2019-10-11 ENCOUNTER — OFFICE VISIT (OUTPATIENT)
Dept: PHYSICAL THERAPY | Facility: CLINIC | Age: 24
End: 2019-10-11
Payer: COMMERCIAL

## 2019-10-11 DIAGNOSIS — M25.552 HIP PAIN, BILATERAL: Primary | ICD-10-CM

## 2019-10-11 DIAGNOSIS — M25.551 HIP PAIN, BILATERAL: Primary | ICD-10-CM

## 2019-10-11 PROCEDURE — 97140 MANUAL THERAPY 1/> REGIONS: CPT

## 2019-10-11 PROCEDURE — 97110 THERAPEUTIC EXERCISES: CPT

## 2019-10-11 NOTE — PROGRESS NOTES
Daily Note     Today's date: 10/11/2019  Patient name: Mohamud Clemente  : 1995  MRN: [id number]  Referring provider: Loni Garay DO  Dx:   Encounter Diagnosis     ICD-10-CM    1  Hip pain, bilateral M25 551     M25 552                   Subjective: "It didn't even hurt yesterday " Reports jet lag hit yesterday  Notes today "it's okay, but it's usually good in the morning "    Objective: See treatment diary below      Assessment: Performed exercise program w/o complaint  Demonstrates good knowledge of same  Responded well to tiger tail STM to B hip flexors  Relief after tx  Will monitor  Plan: Continue per plan of care        Precautions: none      Manual  9/20 9/23 9/26 9/30 10/9 10/11       [place] tail to B/L ant hip  8' 8' 8' 8' 8'                                                               Exercise Diary  9/20 9/23 9/26 9/30 10/9 10/11       Bike  8' 8' 8' 8' 8'       Supine hip flex stretch  Demo 3x30" ea 3x30" ea 3x30"  ea 3x30"  ea 3x30"  ea       SLR 3 ways  x15 x15 x15 x15 x15       Bridges  x15 x15 x15 x15 x15       Clamshells/reverse  GTB x15 ea GTB x15 GTB  x15 GTB  x15 GTB  x15       Quad hip ext (kb)  x15 x15  x15 x15 x15       Quad hip abd  x15 x15 x15 x15 x15       Prone quad stretch w/ strap  3x30" ea 3x30" ea  3x30"  ea 3x30"  ea 3x30"  ea       Step ups (f/l)     6"x15  ea 6"x15  ea       Step downs             Toe taps on cones  x15 ea x15 ea x15ea x15ea x15ea       Step ups on bosu             Squats over mat   x15 x15 NP x15       upsidedown Bosu squats             SLS on airex             Standing hip flex tband at ankle  x15 red x15 red x15  red x15  red x15  red

## 2019-10-18 ENCOUNTER — OFFICE VISIT (OUTPATIENT)
Dept: PHYSICAL THERAPY | Facility: CLINIC | Age: 24
End: 2019-10-18
Payer: COMMERCIAL

## 2019-10-18 DIAGNOSIS — M25.551 HIP PAIN, BILATERAL: Primary | ICD-10-CM

## 2019-10-18 DIAGNOSIS — M25.552 HIP PAIN, BILATERAL: Primary | ICD-10-CM

## 2019-10-18 PROCEDURE — 97140 MANUAL THERAPY 1/> REGIONS: CPT | Performed by: PHYSICAL THERAPIST

## 2019-10-18 PROCEDURE — 97110 THERAPEUTIC EXERCISES: CPT | Performed by: PHYSICAL THERAPIST

## 2019-10-18 PROCEDURE — 97112 NEUROMUSCULAR REEDUCATION: CPT | Performed by: PHYSICAL THERAPIST

## 2019-10-18 NOTE — PROGRESS NOTES
Daily Note     Today's date: 10/18/2019  Patient name: Martita Velez  : 1995  MRN: [id number]  Referring provider: Feliberto Viramontes DO  Dx:   Encounter Diagnosis     ICD-10-CM    1  Hip pain, bilateral M25 551     M25 552                   Subjective: Patient stated pain level 1/10 prior to treatment session  Objective: See treatment diary below      Assessment: Patient performed increased reps with exercises as listed without c/o pain; mild soreness with [place] tail to bilateral ant hips  Plan: Continue per plan of care  Precautions: none      Manual  9/20 9/23 9/26 9/30 10/9 10/11 10/18      [place] tail to B/L ant hip  8' 8' 8' 8' 8' 8'                                                              Exercise Diary  9/20 9/23 9/26 9/30 10/9 10/11 10/18      Bike  8' 8' 8' 8' 8' 8'      Supine hip flex stretch  Demo 3x30" ea 3x30" ea 3x30"  ea 3x30"  ea 3x30"  ea 3x30"  ea      SLR 3 ways  x15 x15 x15 x15 x15 x20      Bridges  x15 x15 x15 x15 x15 x20      Clamshells/reverse  GTB x15 ea GTB x15 GTB  x15 GTB  x15 GTB  x15 GTB x20      Quad hip ext (kb)  x15 x15  x15 x15 x15 x20      Quad hip abd  x15 x15 x15 x15 x15 x20      Prone quad stretch w/ strap  3x30" ea 3x30" ea  3x30"  ea 3x30"  ea 3x30"  ea 3x30"  ea      Step ups (f/l)     6"x15  ea 6"x15  ea 6" x 20 ea  Step downs             Toe taps on cones  x15 ea x15 ea x15ea x15ea x15ea x20 ea        Step ups on bosu             Squats over mat   x15 x15 NP x15 x20      upsidedown Bosu squats             SLS on airex             Standing hip flex tband at ankle  x15 red x15 red x15  red x15  red x15  red x20 red

## 2019-10-25 ENCOUNTER — OFFICE VISIT (OUTPATIENT)
Dept: PHYSICAL THERAPY | Facility: CLINIC | Age: 24
End: 2019-10-25
Payer: COMMERCIAL

## 2019-10-25 DIAGNOSIS — M25.551 HIP PAIN, BILATERAL: Primary | ICD-10-CM

## 2019-10-25 DIAGNOSIS — M25.552 HIP PAIN, BILATERAL: Primary | ICD-10-CM

## 2019-10-25 PROCEDURE — 97140 MANUAL THERAPY 1/> REGIONS: CPT

## 2019-10-25 PROCEDURE — 97112 NEUROMUSCULAR REEDUCATION: CPT

## 2019-10-25 PROCEDURE — 97110 THERAPEUTIC EXERCISES: CPT

## 2019-10-25 NOTE — PROGRESS NOTES
Daily Note     Today's date: 10/25/2019  Patient name: Cristiana Aviles  : 1995  MRN: [id number]  Referring provider: Quincy Bal DO  Dx:   Encounter Diagnosis     ICD-10-CM    1  Hip pain, bilateral M25 551     M25 552                   Subjective: "It's been okay," adding "the other day they were both hurting, and I took some Aleve " Notes "they seem to keep switching sides "     Objective: See treatment diary below      Assessment: Performed exercise program w/o difficulty or discomfort  [place] tail massage w/o complaint  Will monitor  Plan: Continue per plan of care  Precautions: none      Manual  9/20 9/23 9/26 9/30 10/9 10/11 10/18 10/25     [place] tail to B/L ant hip  8' 8' 8' 8' 8' 8' 8'                                                             Exercise Diary  9/20 9/23 9/26 9/30 10/9 10/11 10/18 10/25     Bike  8' 8' 8' 8' 8' 8' 8'     Supine hip flex stretch  Demo 3x30" ea 3x30" ea 3x30"  ea 3x30"  ea 3x30"  ea 3x30"  ea 3x30"  ea     SLR 3 ways  x15 x15 x15 x15 x15 x20 x20     Bridges  x15 x15 x15 x15 x15 x20 x20     Clamshells/reverse  GTB x15 ea GTB x15 GTB  x15 GTB  x15 GTB  x15 GTB x20 GTB  x20     Quad hip ext (kb)  x15 x15  x15 x15 x15 x20 x20     Quad hip abd  x15 x15 x15 x15 x15 x20 x20     Prone quad stretch w/ strap  3x30" ea 3x30" ea  3x30"  ea 3x30"  ea 3x30"  ea 3x30"  ea 3x30"  ea     Step ups (f/l)     6"x15  ea 6"x15  ea 6" x 20 ea  6"x  20ea     Step downs             Toe taps on cones  x15 ea x15 ea x15ea x15ea x15ea x20 ea   x20  ea     Step ups on bosu             Squats over mat   x15 x15 NP x15 x20 x20     upsidedown Bosu squats             SLS on airex             Standing hip flex tband at ankle  x15 red x15 red x15  red x15  red x15  red x20 red x20  red

## 2019-10-30 ENCOUNTER — OFFICE VISIT (OUTPATIENT)
Dept: PHYSICAL THERAPY | Facility: CLINIC | Age: 24
End: 2019-10-30
Payer: COMMERCIAL

## 2019-10-30 DIAGNOSIS — M25.552 HIP PAIN, BILATERAL: Primary | ICD-10-CM

## 2019-10-30 DIAGNOSIS — M25.551 HIP PAIN, BILATERAL: Primary | ICD-10-CM

## 2019-10-30 PROCEDURE — 97140 MANUAL THERAPY 1/> REGIONS: CPT | Performed by: PHYSICAL THERAPY ASSISTANT

## 2019-10-30 PROCEDURE — 97112 NEUROMUSCULAR REEDUCATION: CPT | Performed by: PHYSICAL THERAPY ASSISTANT

## 2019-10-30 PROCEDURE — 97110 THERAPEUTIC EXERCISES: CPT | Performed by: PHYSICAL THERAPY ASSISTANT

## 2019-10-30 NOTE — PROGRESS NOTES
Daily Note     Today's date: 10/30/2019  Patient name: Patsy Daly  : 1995  MRN: [id number]  Referring provider: Katalina Fowler DO  Dx:   Encounter Diagnosis     ICD-10-CM    1  Hip pain, bilateral M25 551     M25 552                   Subjective: Pt reports she is feling pretty good today  Notes that the pain she has sems to keep switching sides  Objective: See treatment diary below      Assessment: Performed exercise program with progressions as noted w/o difficulty  Reports good relief following [place] tail massage     Plan: Continue per plan of care  Precautions: none      Manual  9/20 9/23 9/26 9/30 10/9 10/11 10/18 10/25 10/30    [place] tail to B/L ant hip  8' 8' 8' 8' 8' 8' 8' 8'                                                            Exercise Diary  9/20 9/23 9/26 9/30 10/9 10/11 10/18 10/25 10/30    Bike  8' 8' 8' 8' 8' 8' 8' 8'    Supine hip flex stretch  Demo 3x30" ea 3x30" ea 3x30"  ea 3x30"  ea 3x30"  ea 3x30"  ea 3x30"  ea 3x30" ea    SLR 3 ways  x15 x15 x15 x15 x15 x20 x20 x20    Bridges  x15 x15 x15 x15 x15 x20 x20 x20    Clamshells/reverse  GTB x15 ea GTB x15 GTB  x15 GTB  x15 GTB  x15 GTB x20 GTB  x20 GTB  x20    Quad hip ext (kb)  x15 x15  x15 x15 x15 x20 x20 x20    Quad hip abd  x15 x15 x15 x15 x15 x20 x20 x20    Prone quad stretch w/ strap  3x30" ea 3x30" ea  3x30"  ea 3x30"  ea 3x30"  ea 3x30"  ea 3x30"  ea 3x30" ea    Step ups (f/l)     6"x15  ea 6"x15  ea 6" x 20 ea  6"x  20ea 8"x20 ea    Step downs             Toe taps on cones  x15 ea x15 ea x15ea x15ea x15ea x20 ea   x20  ea     Step ups on bosu         Fwd  x15 ea    Squats over mat   x15 x15 NP x15 x20 x20 x20    upsidedown Bosu squats             SLS on airex             Standing hip flex tband at ankle  x15 red x15 red x15  red x15  red x15  red x20 red x20  red x20 red

## 2020-10-06 ENCOUNTER — ANNUAL EXAM (OUTPATIENT)
Dept: OBGYN CLINIC | Facility: CLINIC | Age: 25
End: 2020-10-06
Payer: COMMERCIAL

## 2020-10-06 VITALS
DIASTOLIC BLOOD PRESSURE: 60 MMHG | BODY MASS INDEX: 30.51 KG/M2 | WEIGHT: 194.4 LBS | HEIGHT: 67 IN | TEMPERATURE: 95.7 F | SYSTOLIC BLOOD PRESSURE: 108 MMHG

## 2020-10-06 DIAGNOSIS — Z30.431 IUD CHECK UP: ICD-10-CM

## 2020-10-06 DIAGNOSIS — Z01.419 ENCOUNTER FOR GYNECOLOGICAL EXAMINATION (GENERAL) (ROUTINE) WITHOUT ABNORMAL FINDINGS: Primary | ICD-10-CM

## 2020-10-06 PROCEDURE — 1036F TOBACCO NON-USER: CPT | Performed by: OBSTETRICS & GYNECOLOGY

## 2020-10-06 PROCEDURE — 99395 PREV VISIT EST AGE 18-39: CPT | Performed by: OBSTETRICS & GYNECOLOGY

## 2020-11-09 ENCOUNTER — OFFICE VISIT (OUTPATIENT)
Dept: FAMILY MEDICINE CLINIC | Facility: CLINIC | Age: 25
End: 2020-11-09
Payer: COMMERCIAL

## 2020-11-09 VITALS
DIASTOLIC BLOOD PRESSURE: 60 MMHG | HEIGHT: 68 IN | WEIGHT: 191.3 LBS | RESPIRATION RATE: 16 BRPM | TEMPERATURE: 97 F | BODY MASS INDEX: 28.99 KG/M2 | SYSTOLIC BLOOD PRESSURE: 110 MMHG | HEART RATE: 68 BPM

## 2020-11-09 DIAGNOSIS — L23.9 ALLERGIC CONTACT DERMATITIS, UNSPECIFIED TRIGGER: Primary | ICD-10-CM

## 2020-11-09 DIAGNOSIS — Z23 NEED FOR VACCINATION: ICD-10-CM

## 2020-11-09 PROCEDURE — 90471 IMMUNIZATION ADMIN: CPT

## 2020-11-09 PROCEDURE — 3008F BODY MASS INDEX DOCD: CPT | Performed by: PHYSICIAN ASSISTANT

## 2020-11-09 PROCEDURE — 3725F SCREEN DEPRESSION PERFORMED: CPT | Performed by: PHYSICIAN ASSISTANT

## 2020-11-09 PROCEDURE — 1036F TOBACCO NON-USER: CPT | Performed by: PHYSICIAN ASSISTANT

## 2020-11-09 PROCEDURE — 99213 OFFICE O/P EST LOW 20 MIN: CPT | Performed by: PHYSICIAN ASSISTANT

## 2020-11-09 PROCEDURE — 90686 IIV4 VACC NO PRSV 0.5 ML IM: CPT

## 2020-11-09 RX ORDER — ALCLOMETASONE DIPROPIONATE 0.5 MG/G
CREAM TOPICAL 2 TIMES DAILY
Qty: 30 G | Refills: 0 | Status: SHIPPED | OUTPATIENT
Start: 2020-11-09

## 2020-12-27 ENCOUNTER — IMMUNIZATIONS (OUTPATIENT)
Dept: FAMILY MEDICINE CLINIC | Facility: HOSPITAL | Age: 25
End: 2020-12-27
Payer: COMMERCIAL

## 2020-12-27 DIAGNOSIS — Z23 ENCOUNTER FOR IMMUNIZATION: ICD-10-CM

## 2020-12-27 PROCEDURE — 91301 SARS-COV-2 / COVID-19 MRNA VACCINE (MODERNA) 100 MCG: CPT

## 2020-12-27 PROCEDURE — 0011A SARS-COV-2 / COVID-19 MRNA VACCINE (MODERNA) 100 MCG: CPT

## 2021-01-25 ENCOUNTER — IMMUNIZATIONS (OUTPATIENT)
Dept: FAMILY MEDICINE CLINIC | Facility: HOSPITAL | Age: 26
End: 2021-01-25

## 2021-01-25 DIAGNOSIS — Z23 ENCOUNTER FOR IMMUNIZATION: Primary | ICD-10-CM

## 2021-01-25 PROCEDURE — 0012A SARS-COV-2 / COVID-19 MRNA VACCINE (MODERNA) 100 MCG: CPT

## 2021-01-25 PROCEDURE — 91301 SARS-COV-2 / COVID-19 MRNA VACCINE (MODERNA) 100 MCG: CPT

## 2021-09-12 ENCOUNTER — APPOINTMENT (OUTPATIENT)
Dept: LAB | Facility: CLINIC | Age: 26
End: 2021-09-12

## 2021-09-12 DIAGNOSIS — Z00.8 HEALTH EXAMINATION IN POPULATION SURVEY: ICD-10-CM

## 2021-09-12 LAB
CHOLEST SERPL-MCNC: 183 MG/DL (ref 50–200)
EST. AVERAGE GLUCOSE BLD GHB EST-MCNC: 97 MG/DL
HBA1C MFR BLD: 5 %
HDLC SERPL-MCNC: 89 MG/DL
LDLC SERPL CALC-MCNC: 88 MG/DL (ref 0–100)
NONHDLC SERPL-MCNC: 94 MG/DL
TRIGL SERPL-MCNC: 31 MG/DL

## 2021-09-12 PROCEDURE — 83036 HEMOGLOBIN GLYCOSYLATED A1C: CPT

## 2021-09-12 PROCEDURE — 36415 COLL VENOUS BLD VENIPUNCTURE: CPT

## 2021-09-12 PROCEDURE — 80061 LIPID PANEL: CPT

## 2021-10-12 ENCOUNTER — ANNUAL EXAM (OUTPATIENT)
Dept: OBGYN CLINIC | Facility: CLINIC | Age: 26
End: 2021-10-12
Payer: COMMERCIAL

## 2021-10-12 VITALS
WEIGHT: 196 LBS | DIASTOLIC BLOOD PRESSURE: 64 MMHG | BODY MASS INDEX: 30.76 KG/M2 | HEIGHT: 67 IN | SYSTOLIC BLOOD PRESSURE: 114 MMHG

## 2021-10-12 DIAGNOSIS — Z97.5 IUD (INTRAUTERINE DEVICE) IN PLACE: ICD-10-CM

## 2021-10-12 DIAGNOSIS — Z01.419 ENCOUNTER FOR ANNUAL ROUTINE GYNECOLOGICAL EXAMINATION: Primary | ICD-10-CM

## 2021-10-12 PROCEDURE — 99395 PREV VISIT EST AGE 18-39: CPT | Performed by: OBSTETRICS & GYNECOLOGY

## 2021-10-12 PROCEDURE — G0145 SCR C/V CYTO,THINLAYER,RESCR: HCPCS | Performed by: OBSTETRICS & GYNECOLOGY

## 2021-10-20 LAB
LAB AP GYN PRIMARY INTERPRETATION: NORMAL
LAB AP LMP: NORMAL
Lab: NORMAL

## 2021-12-18 ENCOUNTER — IMMUNIZATIONS (OUTPATIENT)
Dept: FAMILY MEDICINE CLINIC | Facility: HOSPITAL | Age: 26
End: 2021-12-18

## 2021-12-18 DIAGNOSIS — Z23 ENCOUNTER FOR IMMUNIZATION: Primary | ICD-10-CM

## 2021-12-18 PROCEDURE — 0001A COVID-19 PFIZER VACC 0.3 ML: CPT

## 2021-12-18 PROCEDURE — 91300 COVID-19 PFIZER VACC 0.3 ML: CPT

## 2022-02-10 ENCOUNTER — OFFICE VISIT (OUTPATIENT)
Dept: OBGYN CLINIC | Facility: CLINIC | Age: 27
End: 2022-02-10
Payer: COMMERCIAL

## 2022-02-10 VITALS — DIASTOLIC BLOOD PRESSURE: 62 MMHG | SYSTOLIC BLOOD PRESSURE: 102 MMHG | BODY MASS INDEX: 30.54 KG/M2 | WEIGHT: 195 LBS

## 2022-02-10 DIAGNOSIS — Z97.5 IUD (INTRAUTERINE DEVICE) IN PLACE: Primary | ICD-10-CM

## 2022-02-10 PROCEDURE — 99213 OFFICE O/P EST LOW 20 MIN: CPT | Performed by: OBSTETRICS & GYNECOLOGY

## 2022-02-10 NOTE — PROGRESS NOTES
GYN Problem Visit    HPI:  Patient had episode of cramping and inability to find IUD strings  Period came on a few days later - strings now palpable  No further pelvic pain  PMH, PSH, Meds, Allergies, SocHx, FamHx reviewed and no changes noted  Review of Systems   Constitutional: Negative for chills, decreased appetite, fever and malaise/fatigue  Respiratory: Negative for cough, shortness of breath, sputum production and wheezing  Gastrointestinal: Negative for bloating, abdominal pain, nausea and vomiting  Genitourinary: Negative for flank pain, non-menstrual bleeding, pelvic pain and urgency  Vitals:    02/10/22 1138   BP: 102/62       Physical Exam  Constitutional:       Appearance: Normal appearance  Genitourinary:      No vaginal discharge or bleeding  Right Adnexa: not tender, not full and no mass present  Left Adnexa: not tender, not full and no mass present  No cervical discharge or polyp  IUD strings visualized  Uterus is not enlarged, fixed or tender  HENT:      Head: Normocephalic  Cardiovascular:      Rate and Rhythm: Normal rate and regular rhythm  Pulmonary:      Effort: Pulmonary effort is normal    Abdominal:      Palpations: Abdomen is soft  Tenderness: There is no abdominal tenderness  Musculoskeletal:         General: No swelling  Neurological:      General: No focal deficit present  Mental Status: She is alert and oriented to person, place, and time  Skin:     General: Skin is warm and dry  Psychiatric:         Mood and Affect: Mood normal          Behavior: Behavior normal    Vitals reviewed       Impression:  IUD Check    Plan:  Follow up PRN

## 2022-02-24 NOTE — PROGRESS NOTES
Assessment/Plan:    1  Dysuria/ Vaginal discharge/ Acute vaginitis  - Urine culture  - POCT urine dip negative  - Urine culture obtained and we will call with results  - Chlamydia/GC amplified DNA by PCR  - VAGINOSIS DNA PROBE (AFFIRM)  - f/u in the office in near future with Elías Collazo for Gyn exam/ pap smear or sooner if symptoms persist or worsen  The treatment plan was reviewed with the patient  The patient understands and agrees with the treatment plan      Subjective:   Chief Complaint   Patient presents with    Vaginal Discharge      Patient ID: Reyna Humphries is a 26 y o  female who is here today with c/o vaginal d/c, itching and burning with urination onset 3 days ago, she has been using OTC Azo and reports improvement of itching and burning, but still having whitish/ yellow vaginal d/c  Pt denies abdominal pain, nausea/ vomiting or diarrhea, fever or chills, urinary frequency, urgency or blood in her urine  Pt reports her LMP on 07/07/18 which was normal and regular on BCP's  She is sexually active and using condoms, she denies using any new condom brands or any vaginal wipes  History reviewed  No pertinent past medical history  History reviewed  No pertinent surgical history  Family History   Problem Relation Age of Onset    No Known Problems Mother     No Known Problems Father     Substance Abuse Neg Hx     Mental illness Neg Hx      Social History     Social History    Marital status: Single     Spouse name: N/A    Number of children: N/A    Years of education: N/A     Occupational History    Not on file       Social History Main Topics    Smoking status: Never Smoker    Smokeless tobacco: Never Used    Alcohol use No    Drug use: No    Sexual activity: Not on file     Other Topics Concern    Not on file     Social History Narrative    No narrative on file       Current Outpatient Prescriptions:     JUNEL FE 1/20 1-20 MG-MCG per tablet, TAKE 1 TABLET DAILY AS Physical Therapy     Referred by: Jose Luis Russell DPM; Medical Diagnosis (from order):    Diagnosis Information      Diagnosis    726.71 (ICD-9-CM) - M76.61, M76.62 (ICD-10-CM) - Achilles tendinitis of both lower extremities    727.06 (ICD-9-CM) - M65.9 (ICD-10-CM) - Synovitis of left ankle    728.71 (ICD-9-CM) - M72.2 (ICD-10-CM) - Plantar fasciitis, bilateral    736.72 (ICD-9-CM) - M21.6X1, M21.6X2 (ICD-10-CM) - Acquired equinus deformity of both feet              Payor: Medicaid - [facility]  Authorization Needed: No  Maximum Visit Limit Per Year: Based on medical necessity  CoPay: Do not collect  Notes: All treatment provided by a PTA/CORNELIUS must have PT/OT co-signature  Non-covered: dry needling, iontophoresis, orthotics.   POOL THERAPY IS COVERED  TENS UNIT IS NOT COVERED     *POC requires MD, NP, PA, or DO co-signature*    Daily Treatment Note    Visit:  2     SUBJECTIVE                                                                                                               I felt alright after last session. Exercises are going well. I have a tennis ball and was able to use it under my feet.   Functional Change: See above.   Pain / Symptoms:  Pain rating (out of 10): Current: 7 (bilateral posterior heel, plantar arch)     OBJECTIVE                                                                                                                        TREATMENT                                                                                                                  Therapeutic Exercise:  Verbal and tactile instruction provided for the exercises below to ensure proper performance and muscular activation.  Unless otherwise noted, stretches held for 30 seconds x 2-3 sets and strength exercises performed 1-2 sets of 10 repetitions bilaterally.    *Indicates added to HEP with handout provided  -NuStep, level 4, seat 12, 5 minutes while verbally reviewing HEP and collecting subjective information.   -sidelying  DIRECTED , Disp: 84 tablet, Rfl: 3    Review of Systems   Constitutional: Negative for chills and fatigue  Respiratory: Negative for cough, shortness of breath and wheezing  Cardiovascular: Negative for chest pain  Gastrointestinal: Negative for abdominal pain, blood in stool, diarrhea, nausea and vomiting  Genitourinary: Positive for dysuria and vaginal discharge  Negative for difficulty urinating, dyspareunia, flank pain, frequency, genital sores, hematuria, menstrual problem, pelvic pain and urgency  Skin: Negative for rash  Objective:    Vitals:    07/20/18 1043   BP: 110/72   BP Location: Left arm   Patient Position: Sitting   Cuff Size: Adult   Pulse: 60   Resp: 16   Temp: 98 °F (36 7 °C)   Weight: 90 kg (198 lb 6 4 oz)   Height: 5' 7" (1 702 m)        Physical Exam   Constitutional: She is oriented to person, place, and time  She appears well-developed and well-nourished  No distress  Pulmonary/Chest: Effort normal and breath sounds normal  No respiratory distress  Abdominal: Soft  Bowel sounds are normal  She exhibits no distension and no mass  There is no hepatomegaly  There is no tenderness  There is no CVA tenderness  Genitourinary: There is no rash, tenderness or lesion on the right labia  There is no rash, tenderness or lesion on the left labia  Lymphadenopathy:        Right: No inguinal adenopathy present  Left: No inguinal adenopathy present  Neurological: She is alert and oriented to person, place, and time           Office Visit on 07/20/2018   Component Date Value Ref Range Status    LEUKOCYTE ESTERASE,UA 07/20/2018 neg   Final     NITRITE,UA 07/20/2018 neg   Final    SL AMB POCT UROBILINOGEN 07/20/2018 0 2   Final    SL AMB POCT URINE PROTEIN 07/20/2018 30   Final     PH,UA 07/20/2018 7 5   Final     BLOOD,UA 07/20/2018 neg   Final     SPECIFIC GRAVITY,UA 07/20/2018 1 010   Final     KETONES,UA 07/20/2018 neg   Final     BILIRUBIN,UA 07/20/2018 neg clamshell  -standing gastroc stretch on wall  -standing soleus stretch on wall  -dorsiflexion self mobilization on 2nd step, on/off pressure  -seated toe spreading  -seated toe flexion  -seated dorsiflexion heel slides    -Discussion of footwear or getting inserts for shoes. Recommended semi-rigid inserts to improve support of high arches while ambulating and to improve ability to ambulate with less pain. Provided patient with handout. Demonstrates understanding.     Manual Therapy:  Patient granted this therapist permission to perform manual techniques in all areas mentioned below prior to implementation  -instrument assisted soft-tissue mobilization of bilateral plantar arches  -soft-tissue mobilization of bilateral Achilles' tendon and insertion point to calcaneus  -metatarsal AP glides  -midfoot inversion/eversion mobilizations  -talocrural mobilizations    Skilled input: verbal instruction/cues, tactile instruction/cues, inhibition and facilitation    Writer verbally educated and received verbal consent for hand placement, positioning of patient, and techniques to be performed today from patient for hand placement and palpation for techniques and therapist position for techniques as described above and how they are pertinent to the patient's plan of care.    Home Exercise Program/Education Materials: Access Code: [email or web address]  URL: [email or web address]  Date: 02/22/2022  Prepared by: Salina Suarez    Exercises  Sidelying Clamshell in Neutral - 1 x daily - 2 sets - 15 reps - 2-3 sec hold  Seated Plantar Fascia Mobilization with Small Ball - 3 x daily - 2 sets - 10 reps  Gastroc Stretch on Wall - 3 x daily - 2 sets - 30-60 sec hold  Soleus Stretch on Wall - 3 x daily - 2 sets - 30-60 sec hold     ASSESSMENT                                                                                                             Patient tolerates session well overall. Discussed obtaining new footwear or  Final    GLUCOSE, UA 07/20/2018 neg   Final     COLOR,UA 07/20/2018 Yellow   Final     CLARITY,UA 07/20/2018 Cloudy   Final inserts to support arches while ambulating for neutral lower extremity and foot positioning. She needs further skilled PT for balance training on various surfaces and to reduce pain when standing and walking.   Pain/symptoms after session (out of 10): 7  Patient Education:   Results of above outlined education: Verbalizes understanding and Demonstrates understanding      PLAN                                                                                                                           Suggestions for next session as indicated: Progress per plan of care  Not covered: dry needling, iontophoresis, orthotics  NuStep warm-up  Check HEP PRN  Standing balance training on various surfaces  Lower extremity strengthening (eccentric heel raises, standing hip abduction)  Intrinsic foot strengthening  Manual therapy - trial prone for gastroc/soleus/Achilles' and arches         Therapy procedure time and total treatment time can be found documented on the Time Entry flowsheet

## 2022-03-22 ENCOUNTER — OFFICE VISIT (OUTPATIENT)
Dept: DERMATOLOGY | Facility: CLINIC | Age: 27
End: 2022-03-22
Payer: COMMERCIAL

## 2022-03-22 VITALS — HEIGHT: 68 IN | WEIGHT: 194 LBS | BODY MASS INDEX: 29.4 KG/M2 | TEMPERATURE: 98 F

## 2022-03-22 DIAGNOSIS — D22.9 MULTIPLE MELANOCYTIC NEVI: Primary | ICD-10-CM

## 2022-03-22 DIAGNOSIS — D48.5 NEOPLASM OF UNCERTAIN BEHAVIOR OF SKIN: ICD-10-CM

## 2022-03-22 PROCEDURE — 11103 TANGNTL BX SKIN EA SEP/ADDL: CPT | Performed by: DERMATOLOGY

## 2022-03-22 PROCEDURE — 99204 OFFICE O/P NEW MOD 45 MIN: CPT | Performed by: DERMATOLOGY

## 2022-03-22 PROCEDURE — 11102 TANGNTL BX SKIN SINGLE LES: CPT | Performed by: DERMATOLOGY

## 2022-03-22 PROCEDURE — 88305 TISSUE EXAM BY PATHOLOGIST: CPT | Performed by: STUDENT IN AN ORGANIZED HEALTH CARE EDUCATION/TRAINING PROGRAM

## 2022-03-22 NOTE — PROGRESS NOTES
[facility] Note     Patient Name: Mireya Morejon  Encounter Date: 3/22/2022     Have you been cared for by a [facility] in the last 3 years and, if so, which one? No    · Have you traveled outside of the [address] [place] [address] in the past 3 months or outside of the [facility] area in the last 2 weeks? No     May we call your Preferred Phone number to discuss your specific medical information? Yes     May we leave a detailed message that includes your specific medical information? Yes      Today's Chief Concerns:   Concern #1:  Skin check   Concern #2:  Lesions on stomach, hips, back    Past Medical History:  Have you personally ever had or currently have any of the following? · Skin cancer (such as Melanoma, Basal Cell Carcinoma, Squamous Cell Carcinoma? (If Yes, please provide more detail)- No  · Eczema: No  · Psoriasis: No  · HIV/AIDS: No  · Hepatitis B or C: No  · Tuberculosis: No  · Systemic Immunosuppression such as Diabetes, Biologic or Immunotherapy, Chemotherapy, Organ Transplantation, Bone Marrow Transplantation (If YES, please provide more detail): No  · Radiation Treatment (If YES, please provide more detail): No  · Any other major medical conditions/concerns? 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 Hiking, paddle boarding    Family History:  Have any of your "first degree relatives" (parent, brother, sister, or child) had any of the following       · Skin cancer such as Melanoma or Merkel Cell Carcinoma or Pancreatic Cancer? No  · Eczema, Asthma, Hay Fever or Seasonal Allergies: YES, sisters have eczema, one sibling has asthma  · Psoriasis or Psoriatic Arthritis: No  · Do any other medical conditions seem to run in your family? If Yes, what condition and which relatives?   No    Current Medications:   (please update all dermatological medications before printing patient's AVS!)      Current Outpatient Medications:     levonorgestrel (KYLEENA) 19 5 MG intrauterine device, 1 Intra Uterine Device by Intrauterine route once, Disp: , Rfl:     alclomethasone (ACLOVATE) 0 05 % cream, Apply topically 2 (two) times a day (Patient not taking: Reported on 10/12/2021), Disp: 30 g, Rfl: 0      Review of Systems:  Have you recently had or currently have any of the following? If YES, what are you doing for the problem? · Fever, chills or unintended weight loss: No  · Sudden loss or change in your vision: No  · Nausea, vomiting or blood in your stool: No  · Painful or swollen joints: No  · Wheezing or cough: No  · Changing mole or non-healing wound: No  · Nosebleeds: No  · Excessive sweating: No  · Easy or prolonged bleeding? No  · Over the last 2 weeks, how often have you been bothered by the following problems? · Taking little interest or pleasure in doing things: 1 - Not at All  · Feeling down, depressed, or hopeless: 1 - Not at All  · Rapid heartbeat with epinephrine:  No    · FEMALES ONLY:    · Are you pregnant or planning to become pregnant? No  · Are you currently or planning to be nursing or breast feeding? No    · Any known allergies? Allergies   Allergen Reactions    Pollen Extract    ·       Physical Exam:     Was a chaperone (Derm Clinical Assistant) present throughout the entire Physical Exam? Yes     Did the Dermatology Team specifically  the patient on the importance of a Full Skin Exam to be sure that nothing is missed clinically?  Yes}  o Did the patient ultimately request or accept a Full Skin Exam?  Yes  o Did the patient specifically refuse to have the areas "under-the-bra" examined by the Dermatologist? No  o Did the patient specifically refuse to have the areas "under-the-underwear" examined by the Dermatologist? No    CONSTITUTIONAL:   Vitals:    03/22/22 1455   Temp: 98 °F (36 7 °C)   TempSrc: Temporal Weight: 88 kg (194 lb)   Height: 5' 8" (1 727 m)       PSYCH: Normal mood and affect  EYES: Normal conjunctiva  ENT: Normal lips and oral mucosa  CARDIOVASCULAR: No edema  RESPIRATORY: Normal respirations  HEME/LYMPH/IMMUNO:  No regional lymphadenopathy except as noted below in "ASSESSMENT AND PLAN BY DIAGNOSIS"    SKIN:  FULL ORGAN SYSTEM EXAM   Hair, Scalp, Ears, Face Normal except as noted below in Assessment   Neck, Cervical Chain Nodes Normal except as noted below in Assessment   Right Arm/Hand/Fingers Normal except as noted below in Assessment   Left Arm/Hand/Fingers Normal except as noted below in Assessment   Chest/Breasts/Axillae Viewed areas Normal except as noted below in Assessment   Abdomen, Umbilicus Normal except as noted below in Assessment   Back/Spine Normal except as noted below in Assessment   Groin/Genitalia/Buttocks Normal except as noted below in Assessment   Right Leg, Foot, Toes Normal except as noted below in Assessment   Left Leg, Foot, Toes Normal except as noted below in Assessment        Assessment and Plan by Diagnosis:    History of Present Condition:     Duration:  How long has this been an issue for you?    o  years   Location Affected:  Where on the body is this affecting you? o  stomach, hips, back   Quality:  Is there any bleeding, pain, itch, burning/irritation, or redness associated with the skin lesion? o  "getting more"   Severity:  Describe any bleeding, pain, itch, burning/irritation, or redness on a scale of 1 to 10 (with 10 being the worst)  o  10   Timing:  Does this condition seem to be there pretty constantly or do you notice it more at specific times throughout the day?    o  Constantly   Context:  Have you ever noticed that this condition seems to be associated with specific activities you do?    o  Denies   Modifying Factors:    o Anything that seems to make the condition worse?    -  Denies  o What have you tried to do to make the condition better? -  Denies    1  MELANOCYTIC NEVI ("Moles")    Physical Exam:   Anatomic Location Affected:   Mostly on sun-exposed areas of the Face, trunk and extremities   Morphological Description:  Scattered, 1-4mm round to ovoid, symmetrical-appearing, even bordered, skin colored to dark brown macules/papules, mostly in sun-exposed areas  Some fleshy hanging papules on stomach and back   Pertinent Positives:   Pertinent Negatives:No regional lymphadenopathy    Additional History of Present Condition:  Discovered on exam    Assessment and Plan:  Based on a thorough discussion of this condition and the management approach to it (including a comprehensive discussion of the known risks, side effects and potential benefits of treatment), the patient (family) agrees to implement the following specific plan:   Observe for changes     Melanocytic Nevi  Melanocytic nevi ("moles") are tan or brown, raised or flat areas of the skin which have an increased number of melanocytes  Melanocytes are the cells in our body which make pigment and account for skin color  Some moles are present at birth (I e , "congenital nevi"), while others come up later in life (i e , "acquired nevi")  The sun can stimulate the body to make more moles  Sunburns are not the only thing that triggers more moles  Chronic sun exposure can do it too  Clinically distinguishing a healthy mole from melanoma may be difficult, even for experienced dermatologists  The "ABCDE's" of moles have been suggested as a means of helping to alert a person to a suspicious mole and the possible increased risk of melanoma  The suggestions for raising alert are as follows:    Asymmetry: Healthy moles tend to be symmetric, while melanomas are often asymmetric  Asymmetry means if you draw a line through the mole, the two halves do not match in color, size, shape, or surface texture   Asymmetry can be a result of rapid enlargement of a mole, the development of a raised area on a previously flat lesion, scaling, ulceration, bleeding or scabbing within the mole  Any mole that starts to demonstrate "asymmetry" should be examined promptly by a board certified dermatologist      Border: Healthy moles tend to have discrete, even borders  The border of a melanoma often blends into the normal skin and does not sharply delineate the mole from normal skin  Any mole that starts to demonstrate "uneven borders" should be examined promptly by a board certified dermatologist      Color: Healthy moles tend to be one color throughout  Melanomas tend to be made up of different colors ranging from dark black, blue, white, or red  Any mole that demonstrates a color change should be examined promptly by a board certified dermatologist      Diameter: Healthy moles tend to be smaller than 0 6 cm in size; an exception are "congenital nevi" that can be larger  Melanomas tend to grow and can often be greater than 0 6 cm (1/4 of an inch, or the size of a pencil eraser)  This is only a guideline, and many normal moles may be larger than 0 6 cm without being unhealthy  Any mole that starts to change in size (small to bigger or bigger to smaller) should be examined promptly by a board certified dermatologist      Evolving: Healthy moles tend to "stay the same "  Melanomas may often show signs of change or evolution such as a change in size, shape, color, or elevation  Any mole that starts to itch, bleed, crust, burn, hurt, or ulcerate or demonstrate a change or evolution should be examined promptly by a board certified dermatologist       Dysplastic Nevi  Dysplastic moles are moles that fit the ABCDE rules of melanoma but are not identified as melanomas when examined under the microscope  They may indicate an increased risk of melanoma in that person  If there is a family history of melanoma, most experts agree that the person may be at an increased risk for developing a melanoma    Experts still do not agree on what dysplastic moles mean in patients without a personal or family history of melanoma  Dysplastic moles are usually larger than common moles and have different colors within it with irregular borders  The appearance can be very similar to a melanoma  Biopsies of dysplastic moles may show abnormalities which are different from a regular mole  Melanoma  Malignant melanoma is a type of skin cancer that can be deadly if it spreads throughout the body  The incidence of melanoma in the [place] is growing faster than any other cancer  Melanoma usually grows near the surface of the skin for a period of time, and then begins to grow deeper into the skin  Once it grows deeper into the skin, the risk of spread to other organs greatly increases  Therefore, early detection and removal of a malignant melanoma may result in a better chance at a complete cure; removal after the tumor has spread may not be as effective, leading to worse clinical outcomes such as death  The true rate of nevus transformation into a melanoma is unknown  It has been estimated that the lifetime risk for any acquired melanocytic nevus on any 21year-old individual transforming into melanoma by age [de-identified] is 0 03% (1 in 3,164) for men and 0 009% (1 in 10,800) for women  The appearance of a "new mole" remains one of the most reliable methods for identifying a malignant melanoma  Occasionally, melanomas appear as rapidly growing, blue-black, dome-shaped bumps within a previous mole or previous area of normal skin  Other times, melanomas are suspected when a mole suddenly appears or changes  Itching, burning, or pain in a pigmented lesion should increase suspicion, but most patients with early melanoma have no skin discomfort whatsoever  Melanoma can occur anywhere on the skin, including areas that are difficult for self-examination  Many melanomas are first noticed by other family members    Suspicious-looking moles may be removed for microscopic examination  You may be able to prevent death from melanoma by doing two simple things:    1  Try to avoid unnecessary sun exposure and protect your skin when it is exposed to the sun  People who live near the equator, people who have intermittent exposures to large amounts of sun, and people who have had sunburns in childhood or adolescence have an increased risk for melanoma  Sun sense and vigilant sun protection may be keys to helping to prevent melanoma  We recommend wearing UPF-rated sun protective clothing and sunglasses whenever possible and applying a moisturizer-sunscreen combination product (SPF 50+) such as Neutrogena Daily Defense to sun exposed areas of skin at least three times a day  2  Have your moles regularly examined by a board certified dermatologist AND by yourself or a family member/friend at home  We recommend that you have your moles examined at least once a year by a board certified dermatologist   Use your birthday as an annual reminder to have your "Birthday Suit" (I e , your skin) examined; it is a nice birthday gift to yourself to know that your skin is healthy appearing! Additionally, at-home self examinations may be helpful for detecting a possible melanoma  Use the ABCDEs we discussed and check your moles once a month at home        2  NEOPLASM OF UNCERTAIN BEHAVIOR OF SKIN    Physical Exam:   (Anatomic Location); (Size and Morphological Description); (Differential Diagnosis):  o Specimen A; Right inframammary;; sessile papule with central darkening; Differential diagnosis; irritated nevus vs  Other  o Specimen B; Right upper quadrant; sessile papule; Differential diagnosis; Irritated nevus vs  other      Additional History of Present Condition:  Present for years    Assessment and Plan:   I have discussed with the patient that a sample of skin via a "skin biopsy would be potentially helpful to further make a specific diagnosis under the microscope   Based on a thorough discussion of this condition and the management approach to it (including a comprehensive discussion of the known risks, side effects and potential benefits of treatment), the patient (family) agrees to implement the following specific plan:    o Procedure:  Skin Biopsy  After a thorough discussion of treatment options and risk/benefits/alternatives (including but not limited to local pain, scarring, dyspigmentation, blistering, possible superinfection, and inability to confirm a diagnosis via histopathology), verbal and written consent were obtained and portion of the rash was biopsied for tissue sample  See below for consent that was obtained from patient and subsequent Procedure Note  PROCEDURE SHAVE BIOPSY NOTE:     Performing Physician: Dr Pierre   Anatomic Location; Clinical Description with size (cm); Pre-Op Diagnosis:   o Specimen A; Right inframammary;; sessile papule with central darkening; Differential diagnosis; irritated nevus vs  Other   Post-op diagnosis: Same      Local anesthesia: 1% xylocaine with epi       Topical anesthesia: None     Hemostasis: Aluminum chloride       After obtaining informed consent  at which time there was a discussion about the purpose of biopsy  and low risks of infection and bleeding  The area was prepped and draped in the usual fashion  Anesthesia was obtained with 1% lidocaine with epinephrine  A shave biopsy to an appropriate sampling depth was obtained with a sterile blade (such as a 15-blade or DermaBlade)  The resulting wound was covered with surgical ointment and bandaged appropriately  The patient tolerated the procedure well without complications and was without signs of functional compromise  Specimen has been sent for review by Dermatopathology      Standard post-procedure care has been explained and has been included in written form within the patient's copy of Informed Consent      PROCEDURE SHAVE BIOPSY NOTE:     Performing Physician: Dr Pierre   Anatomic Location; Clinical Description with size (cm); Pre-Op Diagnosis:   o Specimen B; Right upper quadrant; sessile papule; Differential diagnosis; Irritated nevus vs  other   Post-op diagnosis: Same      Local anesthesia: 1% xylocaine with epi       Topical anesthesia: None     Hemostasis: Aluminum chloride       After obtaining informed consent  at which time there was a discussion about the purpose of biopsy  and low risks of infection and bleeding  The area was prepped and draped in the usual fashion  Anesthesia was obtained with 1% lidocaine with epinephrine  A shave biopsy to an appropriate sampling depth was obtained with a sterile blade (such as a 15-blade or DermaBlade)  The resulting wound was covered with surgical ointment and bandaged appropriately  The patient tolerated the procedure well without complications and was without signs of functional compromise  Specimen has been sent for review by Dermatopathology  Standard post-procedure care has been explained and has been included in written form within the patient's copy of Informed Consent  INFORMED CONSENT DISCUSSION AND POST-OPERATIVE INSTRUCTIONS FOR PATIENT    I   RATIONALE FOR PROCEDURE  I understand that a skin biopsy allows the Dermatologist to test a lesion or rash under the microscope to obtain a diagnosis  It usually involves numbing the area with numbing medication and removing a small piece of skin; sometimes the area will be closed with sutures  In this specific procedure, sutures are not usually needed  If any sutures are placed, then they are usually need to be removed in 2 weeks or less  I understand that my Dermatologist recommends that a skin "shave" biopsy be performed today  A local anesthetic, similar to the kind that a dentist uses when filling a cavity, will be injected with a very small needle into the skin area to be sampled    The injected skin and tissue underneath "will go to sleep and become numb so no pain should be felt afterwards  An instrument shaped like a tiny "razor blade" (shave biopsy instrument) will be used to cut a small piece of tissue and skin from the area so that a sample of tissue can be taken and examined more closely under the microscope  A slight amount of bleeding will occur, but it will be stopped with direct pressure and a pressure bandage and any other appropriate methods  I understands that a scar will form where the wound was created  Surgical ointment will be applied to help protect the wound  Sutures are not usually needed  II   RISKS AND POTENTIAL COMPLICATIONS   I understand the risks and potential complications of a skin biopsy include but are not limited to the following:   Bleeding   Infection   Pain   Scar/keloid   Skin discoloration   Incomplete Removal   Recurrence   Nerve Damage/Numbness/Loss of Function   Allergic Reaction to Anesthesia   Biopsies are diagnostic procedures and based on findings additional treatment or evaluation may be required   Loss or destruction of specimen resulting in no additional findings    My Dermatologist has explained to me the nature of the condition, the nature of the procedure, and the benefits to be reasonably expected compared with alternative approaches  My Dermatologist has discussed the likelihood of major risks or complications of this procedure including the specific risks listed above, such as bleeding, infection, and scarring/keloid  I understand that a scar is expected after this procedure  I understand that my physician cannot predict if the scar will form a "keloid," which extends beyond the borders of the wound that is created  A keloid is a thick, painful, and bumpy scar  A keloid can be difficult to treat, as it does not always respond well to therapy, which includes injecting cortisone directly into the keloid every few weeks    While this usually reduces the pain and size of the scar, it does not eliminate it  I understand that photographs may be taken before and after the procedure  These will be maintained as part of the medical providers confidential records and may not be made available to me  I further authorize the medical provider to use the photographs for teaching purposes or to illustrate scientific papers, books, or lectures if in his/her judgment, medical research, education, or science may benefit from its use  I have had an opportunity to fully inquire about the risks and benefits of this procedure and its alternatives  I have been given ample time and opportunity to ask questions and to seek a second opinion if I wished to do so  I acknowledge that there have specifically been no guarantees as to the cosmetic results from the procedure  I am aware that with any procedure there is always the possibility of an unexpected complication  III  POST-PROCEDURAL CARE (WHAT YOU WILL NEED TO DO "AFTER THE BIOPSY" TO OPTIMIZE HEALING)     Keep the area clean and dry  Try NOT to remove the bandage or get it wet for the first 24 hours   Gently clean the area and apply surgical ointment (such as Vaseline petrolatum ointment, which is available "over the counter" and not a prescription) to the biopsy site for up to 2 weeks straight  This acts to protect the wound from the outside world   Sutures are not usually placed in this procedure  If any sutures were placed, return for suture removal as instructed (generally 1 week for the face, 2 weeks for the body)   Take Acetaminophen (Tylenol) for discomfort, if no contraindications  Ibuprofen or aspirin could make bleeding worse   Call our office immediately for signs of infection: fever, chills, increased redness, warmth, tenderness, discomfort/pain, or pus or foul smell coming from the wound  WHAT TO DO IF THERE IS ANY BLEEDING?   If a small amount of bleeding is noticed, place a clean cloth over the area and apply firm pressure for ten minutes  Check the wound after 10 minutes of direct pressure  If bleeding persists, try one more time for an additional 10 minutes of direct pressure on the area  If the bleeding becomes heavier or does not stop after the second attempt, or if you have any other questions about this procedure, then please call your [facility] Dermatologist by calling [phone] (SKIN)  I hereby acknowledge that I have reviewed and verified the site with my Dermatologist and have requested and authorized my Dermatologist to proceed with the procedure        Scribe Attestation    I,:  Genie Crawford MA am acting as a scribe while in the presence of the attending physician :       I,:  Nida Boss MD personally performed the services described in this documentation    as scribed in my presence :

## 2022-03-22 NOTE — PATIENT INSTRUCTIONS
1  MELANOCYTIC NEVI ("Moles")    Physical Exam:   Anatomic Location Affected:   Mostly on sun-exposed areas of the Face, trunk and extremities   Morphological Description:  Scattered, 1-4mm round to ovoid, symmetrical-appearing, even bordered, skin colored to dark brown macules/papules, mostly in sun-exposed areas  Some fleshy hanging papules on stomach and back    Assessment and Plan:  Based on a thorough discussion of this condition and the management approach to it (including a comprehensive discussion of the known risks, side effects and potential benefits of treatment), the patient (family) agrees to implement the following specific plan:   Observe for changes     Melanocytic Nevi  Melanocytic nevi ("moles") are tan or brown, raised or flat areas of the skin which have an increased number of melanocytes  Melanocytes are the cells in our body which make pigment and account for skin color  Some moles are present at birth (I e , "congenital nevi"), while others come up later in life (i e , "acquired nevi")  The sun can stimulate the body to make more moles  Sunburns are not the only thing that triggers more moles  Chronic sun exposure can do it too  Clinically distinguishing a healthy mole from melanoma may be difficult, even for experienced dermatologists  The "ABCDE's" of moles have been suggested as a means of helping to alert a person to a suspicious mole and the possible increased risk of melanoma  The suggestions for raising alert are as follows:    Asymmetry: Healthy moles tend to be symmetric, while melanomas are often asymmetric  Asymmetry means if you draw a line through the mole, the two halves do not match in color, size, shape, or surface texture  Asymmetry can be a result of rapid enlargement of a mole, the development of a raised area on a previously flat lesion, scaling, ulceration, bleeding or scabbing within the mole    Any mole that starts to demonstrate "asymmetry" should be examined promptly by a board certified dermatologist      Missael Ames: Healthy moles tend to have discrete, even borders  The border of a melanoma often blends into the normal skin and does not sharply delineate the mole from normal skin  Any mole that starts to demonstrate "uneven borders" should be examined promptly by a board certified dermatologist      Color: Healthy moles tend to be one color throughout  Melanomas tend to be made up of different colors ranging from dark black, blue, white, or red  Any mole that demonstrates a color change should be examined promptly by a board certified dermatologist      Diameter: Healthy moles tend to be smaller than 0 6 cm in size; an exception are "congenital nevi" that can be larger  Melanomas tend to grow and can often be greater than 0 6 cm (1/4 of an inch, or the size of a pencil eraser)  This is only a guideline, and many normal moles may be larger than 0 6 cm without being unhealthy  Any mole that starts to change in size (small to bigger or bigger to smaller) should be examined promptly by a board certified dermatologist      Evolving: Healthy moles tend to "stay the same "  Melanomas may often show signs of change or evolution such as a change in size, shape, color, or elevation  Any mole that starts to itch, bleed, crust, burn, hurt, or ulcerate or demonstrate a change or evolution should be examined promptly by a board certified dermatologist       Dysplastic Nevi  Dysplastic moles are moles that fit the ABCDE rules of melanoma but are not identified as melanomas when examined under the microscope  They may indicate an increased risk of melanoma in that person  If there is a family history of melanoma, most experts agree that the person may be at an increased risk for developing a melanoma  Experts still do not agree on what dysplastic moles mean in patients without a personal or family history of melanoma    Dysplastic moles are usually larger than common moles and have different colors within it with irregular borders  The appearance can be very similar to a melanoma  Biopsies of dysplastic moles may show abnormalities which are different from a regular mole  Melanoma  Malignant melanoma is a type of skin cancer that can be deadly if it spreads throughout the body  The incidence of melanoma in the [place] is growing faster than any other cancer  Melanoma usually grows near the surface of the skin for a period of time, and then begins to grow deeper into the skin  Once it grows deeper into the skin, the risk of spread to other organs greatly increases  Therefore, early detection and removal of a malignant melanoma may result in a better chance at a complete cure; removal after the tumor has spread may not be as effective, leading to worse clinical outcomes such as death  The true rate of nevus transformation into a melanoma is unknown  It has been estimated that the lifetime risk for any acquired melanocytic nevus on any 21year-old individual transforming into melanoma by age [de-identified] is 0 03% (1 in 3,164) for men and 0 009% (1 in 10,800) for women  The appearance of a "new mole" remains one of the most reliable methods for identifying a malignant melanoma  Occasionally, melanomas appear as rapidly growing, blue-black, dome-shaped bumps within a previous mole or previous area of normal skin  Other times, melanomas are suspected when a mole suddenly appears or changes  Itching, burning, or pain in a pigmented lesion should increase suspicion, but most patients with early melanoma have no skin discomfort whatsoever  Melanoma can occur anywhere on the skin, including areas that are difficult for self-examination  Many melanomas are first noticed by other family members  Suspicious-looking moles may be removed for microscopic examination  You may be able to prevent death from melanoma by doing two simple things:    1   Try to avoid unnecessary sun exposure and protect your skin when it is exposed to the sun  People who live near the equator, people who have intermittent exposures to large amounts of sun, and people who have had sunburns in childhood or adolescence have an increased risk for melanoma  Sun sense and vigilant sun protection may be keys to helping to prevent melanoma  We recommend wearing UPF-rated sun protective clothing and sunglasses whenever possible and applying a moisturizer-sunscreen combination product (SPF 50+) such as Neutrogena Daily Defense to sun exposed areas of skin at least three times a day  2  Have your moles regularly examined by a board certified dermatologist AND by yourself or a family member/friend at home  We recommend that you have your moles examined at least once a year by a board certified dermatologist   Use your birthday as an annual reminder to have your "Birthday Suit" (I e , your skin) examined; it is a nice birthday gift to yourself to know that your skin is healthy appearing! Additionally, at-home self examinations may be helpful for detecting a possible melanoma  Use the ABCDEs we discussed and check your moles once a month at home  INFORMED CONSENT DISCUSSION AND POST-OPERATIVE INSTRUCTIONS FOR PATIENT    I   RATIONALE FOR PROCEDURE  I understand that a skin biopsy allows the Dermatologist to test a lesion or rash under the microscope to obtain a diagnosis  It usually involves numbing the area with numbing medication and removing a small piece of skin; sometimes the area will be closed with sutures  In this specific procedure, sutures are not usually needed  If any sutures are placed, then they are usually need to be removed in 2 weeks or less  I understand that my Dermatologist recommends that a skin "shave" biopsy be performed today    A local anesthetic, similar to the kind that a dentist uses when filling a cavity, will be injected with a very small needle into the skin area to be sampled  The injected skin and tissue underneath "will go to sleep and become numb so no pain should be felt afterwards  An instrument shaped like a tiny "razor blade" (shave biopsy instrument) will be used to cut a small piece of tissue and skin from the area so that a sample of tissue can be taken and examined more closely under the microscope  A slight amount of bleeding will occur, but it will be stopped with direct pressure and a pressure bandage and any other appropriate methods  I understands that a scar will form where the wound was created  Surgical ointment will be applied to help protect the wound  Sutures are not usually needed  II   RISKS AND POTENTIAL COMPLICATIONS   I understand the risks and potential complications of a skin biopsy include but are not limited to the following:   Bleeding   Infection   Pain   Scar/keloid   Skin discoloration   Incomplete Removal   Recurrence   Nerve Damage/Numbness/Loss of Function   Allergic Reaction to Anesthesia   Biopsies are diagnostic procedures and based on findings additional treatment or evaluation may be required   Loss or destruction of specimen resulting in no additional findings    My Dermatologist has explained to me the nature of the condition, the nature of the procedure, and the benefits to be reasonably expected compared with alternative approaches  My Dermatologist has discussed the likelihood of major risks or complications of this procedure including the specific risks listed above, such as bleeding, infection, and scarring/keloid  I understand that a scar is expected after this procedure  I understand that my physician cannot predict if the scar will form a "keloid," which extends beyond the borders of the wound that is created  A keloid is a thick, painful, and bumpy scar    A keloid can be difficult to treat, as it does not always respond well to therapy, which includes injecting cortisone directly into the keloid every few weeks  While this usually reduces the pain and size of the scar, it does not eliminate it  I understand that photographs may be taken before and after the procedure  These will be maintained as part of the medical providers confidential records and may not be made available to me  I further authorize the medical provider to use the photographs for teaching purposes or to illustrate scientific papers, books, or lectures if in his/her judgment, medical research, education, or science may benefit from its use  I have had an opportunity to fully inquire about the risks and benefits of this procedure and its alternatives  I have been given ample time and opportunity to ask questions and to seek a second opinion if I wished to do so  I acknowledge that there have specifically been no guarantees as to the cosmetic results from the procedure  I am aware that with any procedure there is always the possibility of an unexpected complication  III  POST-PROCEDURAL CARE (WHAT YOU WILL NEED TO DO "AFTER THE BIOPSY" TO OPTIMIZE HEALING)     Keep the area clean and dry  Try NOT to remove the bandage or get it wet for the first 24 hours   Gently clean the area and apply surgical ointment (such as Vaseline petrolatum ointment, which is available "over the counter" and not a prescription) to the biopsy site for up to 2 weeks straight  This acts to protect the wound from the outside world   Sutures are not usually placed in this procedure  If any sutures were placed, return for suture removal as instructed (generally 1 week for the face, 2 weeks for the body)   Take Acetaminophen (Tylenol) for discomfort, if no contraindications  Ibuprofen or aspirin could make bleeding worse   Call our office immediately for signs of infection: fever, chills, increased redness, warmth, tenderness, discomfort/pain, or pus or foul smell coming from the wound  WHAT TO DO IF THERE IS ANY BLEEDING?   If a small amount of bleeding is noticed, place a clean cloth over the area and apply firm pressure for ten minutes  Check the wound after 10 minutes of direct pressure  If bleeding persists, try one more time for an additional 10 minutes of direct pressure on the area  If the bleeding becomes heavier or does not stop after the second attempt, or if you have any other questions about this procedure, then please call your [facility]'s Dermatologist by calling [phone] (SKIN)  I hereby acknowledge that I have reviewed and verified the site with my Dermatologist and have requested and authorized my Dermatologist to proceed with the procedure

## 2024-03-26 ENCOUNTER — OFFICE VISIT (OUTPATIENT)
Dept: FAMILY MEDICINE CLINIC | Facility: CLINIC | Age: 29
End: 2024-03-26
Payer: COMMERCIAL

## 2024-03-26 VITALS
TEMPERATURE: 97.8 F | BODY MASS INDEX: 29.73 KG/M2 | HEART RATE: 60 BPM | WEIGHT: 196.2 LBS | OXYGEN SATURATION: 99 % | DIASTOLIC BLOOD PRESSURE: 80 MMHG | RESPIRATION RATE: 16 BRPM | HEIGHT: 68 IN | SYSTOLIC BLOOD PRESSURE: 122 MMHG

## 2024-03-26 DIAGNOSIS — R09.82 POST-NASAL DRIP: Primary | ICD-10-CM

## 2024-03-26 DIAGNOSIS — Z91.09 ENVIRONMENTAL ALLERGIES: ICD-10-CM

## 2024-03-26 PROCEDURE — 99203 OFFICE O/P NEW LOW 30 MIN: CPT | Performed by: STUDENT IN AN ORGANIZED HEALTH CARE EDUCATION/TRAINING PROGRAM

## 2024-03-26 RX ORDER — LEVOCETIRIZINE DIHYDROCHLORIDE 5 MG/1
5 TABLET, FILM COATED ORAL EVERY EVENING
Qty: 30 TABLET | Refills: 2 | Status: SHIPPED | OUTPATIENT
Start: 2024-03-26 | End: 2024-06-24

## 2024-03-26 RX ORDER — FLUTICASONE PROPIONATE 50 MCG
1 SPRAY, SUSPENSION (ML) NASAL DAILY
Qty: 16 G | Refills: 1 | Status: SHIPPED | OUTPATIENT
Start: 2024-03-26 | End: 2024-04-25

## 2024-03-26 NOTE — PROGRESS NOTES
Name: Adelina Mcneal      : 1995      MRN: [id number]  Encounter Provider: Rima Banda MD  Encounter Date: 3/26/2024   Encounter department: [facility]    Assessment & Plan     1. Post-nasal drip  -     levocetirizine (XYZAL) 5 MG tablet; Take 1 tablet (5 mg total) by mouth every evening  -     fluticasone (FLONASE) 50 mcg/act nasal spray; 1 spray into each nostril daily    2. Environmental allergies  -     levocetirizine (XYZAL) 5 MG tablet; Take 1 tablet (5 mg total) by mouth every evening  -     fluticasone (FLONASE) 50 mcg/act nasal spray; 1 spray into each nostril daily    Vitals reviewed with patient normal, exam within normal range, no signs of otitis media or externa    Discussed with patient given her symptoms likely secondary to allergies, recommend antihistamine and Flonase patient reports understanding and in agreement with plan     She is to return to office if no improvement or worsening of symptoms  Subjective      Adelina is a 29-year-old female presents to the office today for an acute care visit.  Patient reports starting yesterday she felt her left ear was full.  Denies any pain or recent illnesses.  Took medication for her sinus with mild relief along with antihistamine.  States she is able to hear fine just feels a little fullness in her ear, no headaches no dizziness no sore throat nasal congestion.  Is a .    Ear Fullness   There is pain in both ears. This is a new problem. The current episode started yesterday. The problem has been unchanged. There has been no fever. The patient is experiencing no pain. Pertinent negatives include no coughing, ear discharge, headaches, hearing loss, rhinorrhea or sore throat. The treatment provided moderate relief. There is no history of a chronic ear infection or hearing loss.     Review of Systems   Constitutional:  Negative for fever.   HENT:  Positive for postnasal drip. Negative  "for ear discharge, ear pain, hearing loss, rhinorrhea, sinus pressure and sore throat.    Respiratory:  Negative for cough.    Neurological:  Negative for headaches.       Current Outpatient Medications on File Prior to Visit   Medication Sig    alclomethasone (ACLOVATE) 0.05 % cream Apply topically 2 (two) times a day (Patient not taking: Reported on 10/12/2021)    levonorgestrel (KYLEENA) 19.5 MG intrauterine device 1 Intra Uterine Device by Intrauterine route once (Patient not taking: Reported on 3/26/2024)       Objective     /80   Pulse 60   Temp 97.8 °F (36.6 °C)   Resp 16   Ht 5' 7.5\" (1.715 m)   Wt 89 kg (196 lb 3.2 oz)   SpO2 99%   BMI 30.28 kg/m²     Physical Exam  Constitutional:       General: She is not in acute distress.  HENT:      Right Ear: Tympanic membrane, ear canal and external ear normal.      Left Ear: Tympanic membrane, ear canal and external ear normal.      Nose: Nose normal. No congestion.      Mouth/Throat:      Mouth: Mucous membranes are moist.      Pharynx: Oropharynx is clear.   Eyes:      Extraocular Movements: Extraocular movements intact.   Cardiovascular:      Rate and Rhythm: Normal rate and regular rhythm.      Pulses: Normal pulses.      Heart sounds: Normal heart sounds.   Neurological:      Mental Status: She is alert.   Psychiatric:         Mood and Affect: Mood normal.       Rima Banda MD    "

## 2024-04-17 DIAGNOSIS — R09.82 POST-NASAL DRIP: ICD-10-CM

## 2024-04-17 DIAGNOSIS — Z91.09 ENVIRONMENTAL ALLERGIES: ICD-10-CM

## 2024-04-17 RX ORDER — LEVOCETIRIZINE DIHYDROCHLORIDE 5 MG/1
5 TABLET, FILM COATED ORAL EVERY EVENING
Qty: 90 TABLET | Refills: 1 | Status: SHIPPED | OUTPATIENT
Start: 2024-04-17

## 2024-10-10 ENCOUNTER — ANNUAL EXAM (OUTPATIENT)
Age: 29
End: 2024-10-10
Payer: COMMERCIAL

## 2024-10-10 VITALS
DIASTOLIC BLOOD PRESSURE: 62 MMHG | SYSTOLIC BLOOD PRESSURE: 102 MMHG | WEIGHT: 201.6 LBS | BODY MASS INDEX: 31.64 KG/M2 | HEIGHT: 67 IN

## 2024-10-10 DIAGNOSIS — Z97.5 IUD (INTRAUTERINE DEVICE) IN PLACE: ICD-10-CM

## 2024-10-10 DIAGNOSIS — Z01.419 ENCOUNTER FOR ANNUAL ROUTINE GYNECOLOGICAL EXAMINATION: Primary | ICD-10-CM

## 2024-10-10 DIAGNOSIS — Z12.4 SCREENING FOR CERVICAL CANCER: ICD-10-CM

## 2024-10-10 PROCEDURE — 99395 PREV VISIT EST AGE 18-39: CPT | Performed by: OBSTETRICS & GYNECOLOGY

## 2024-10-10 NOTE — PROGRESS NOTES
"Assessment/Plan:     1. Encounter for annual routine gynecological examination      2. Screening for cervical cancer    - IGP,rfx Apt HPV ASCU,16/18,45    3. IUD (intrauterine device) in place    - schedule Kyleena IUD exchange        Subjective      Adelina Mcneal is a 29 y.o. female who presents for annual exam. Periods are fairly regular with the Kyleena IUD.  Due for exchange now.  She denies any breast, urinary or sexual concerns.    Current contraception: IUD  History of abnormal Pap smear: no  Regular self breast exam: yes  History of abnormal mammogram: no  History of abnormal lipids: no    Menstrual History:  Menarche age: 17  Patient's last menstrual period was 10/04/2024 (exact date).  Period Cycle (Days): 28  Period Duration (Days): 5-6  Period Pattern: Regular  Menstrual Flow: Moderate, Light (Menses varries month to month. Can be moderate or light a)  Menstrual Control: Tampon  Menstrual Control Change Freq (Hours): 8  Dysmenorrhea: (!) Mild  Dysmenorrhea Symptoms: Cramping    Past Medical History:   Diagnosis Date    Allergic     Pollen, some pet dander       Family History   Problem Relation Age of Onset    No Known Problems Mother     No Known Problems Father     Substance Abuse Neg Hx     Mental illness Neg Hx     Alcohol abuse Neg Hx        The following portions of the patient's history were reviewed and updated as appropriate: allergies, current medications, past family history, past medical history, past social history, past surgical history, and problem list.    Review of Systems  Pertinent items are noted in HPI.      Objective      /62 (BP Location: Left arm, Patient Position: Sitting, Cuff Size: Large)   Ht 5' 7.25\" (1.708 m)   Wt 91.4 kg (201 lb 9.6 oz)   LMP 10/04/2024 (Exact Date)   BMI 31.34 kg/m²     General:   alert and oriented, in no acute distress   Heart:  Breasts: regular rate and rhythm  appear normal, no suspicious masses, no skin or nipple changes or axillary " nodes.   Lungs: Effort normal   Abdomen: soft, non-tender, without masses or organomegaly   Vulva: normal   Vagina: normal mucosa   Cervix: no lesions and IUD strings seen   Uterus: normal size, mobile, non-tender   Adnexa:  Bladder: normal adnexa and no mass, fullness, tenderness  Non-tender

## 2024-10-15 ENCOUNTER — PROCEDURE VISIT (OUTPATIENT)
Age: 29
End: 2024-10-15
Payer: COMMERCIAL

## 2024-10-15 VITALS
HEIGHT: 67 IN | WEIGHT: 201 LBS | DIASTOLIC BLOOD PRESSURE: 62 MMHG | SYSTOLIC BLOOD PRESSURE: 122 MMHG | BODY MASS INDEX: 31.55 KG/M2

## 2024-10-15 DIAGNOSIS — Z32.02 PREGNANCY TEST NEGATIVE: ICD-10-CM

## 2024-10-15 DIAGNOSIS — Z30.433 ENCOUNTER FOR IUD REMOVAL AND REINSERTION: Primary | ICD-10-CM

## 2024-10-15 LAB — SL AMB POCT URINE HCG: NORMAL

## 2024-10-15 PROCEDURE — 58301 REMOVE INTRAUTERINE DEVICE: CPT | Performed by: OBSTETRICS & GYNECOLOGY

## 2024-10-15 PROCEDURE — 58300 INSERT INTRAUTERINE DEVICE: CPT | Performed by: OBSTETRICS & GYNECOLOGY

## 2024-10-15 PROCEDURE — 81025 URINE PREGNANCY TEST: CPT | Performed by: OBSTETRICS & GYNECOLOGY

## 2024-10-15 NOTE — PROGRESS NOTES
IUD Procedure    Date/Time: 10/15/2024 10:58 AM    Performed by: Merary Mahmood MD  Authorized by: Merary Mahmood MD    Other Assisting Provider: No    Verbal consent obtained?: Yes    Risks and benefits: Risks, benefits and alternatives were discussed    Consent given by:  Patient  Time Out:     Time out: Immediately prior to the procedure a time out was called    Patient states understanding of procedure being performed: Yes    Patient's understanding of procedure matches consent: Yes    Required items: Required blood products, implants, devices and special equipment available    Patient identity confirmed:  Verbally with patient  Select procedure: IUD removal and insertion    IUD Insertion:     Negative urine pregnancy test: yes      Cervix cleaned and prepped: yes      Speculum placed in vagina: yes      Tenaculum applied to cervix: yes      IUD inserted with no complications: yes      Strings trimmed: yes      Uterus sounded: yes      Uterus sound depth (cm):  7.5    IUD type:  1 each levonorgestrel 19.5 MG  Post-procedure:     Patient tolerated procedure well: yes      Patient will follow up after next period: yes    IUD Removal:     Reason for removal comment:  Expiration    Removed without complications: yes      Strings visualized: yes      IUD intact: yes

## 2024-10-16 LAB
CYTOLOGIST CVX/VAG CYTO: NORMAL
DX ICD CODE: NORMAL
OTHER STN SPEC: NORMAL
OTHER STN SPEC: NORMAL
PATH REPORT.FINAL DX SPEC: NORMAL
SL AMB NOTE:: NORMAL
SL AMB SPECIMEN ADEQUACY: NORMAL
SL AMB TEST METHODOLOGY: NORMAL

## 2024-11-12 ENCOUNTER — OFFICE VISIT (OUTPATIENT)
Age: 29
End: 2024-11-12
Payer: COMMERCIAL

## 2024-11-12 VITALS
WEIGHT: 201 LBS | BODY MASS INDEX: 31.55 KG/M2 | HEIGHT: 67 IN | SYSTOLIC BLOOD PRESSURE: 122 MMHG | DIASTOLIC BLOOD PRESSURE: 66 MMHG

## 2024-11-12 DIAGNOSIS — Z97.5 IUD (INTRAUTERINE DEVICE) IN PLACE: Primary | ICD-10-CM

## 2024-11-12 PROCEDURE — 99213 OFFICE O/P EST LOW 20 MIN: CPT | Performed by: OBSTETRICS & GYNECOLOGY

## 2024-11-12 NOTE — PROGRESS NOTES
"Assessment:     29 y.o., continuing  Kyleena IUD , no contraindications.     Plan:    RTO for annual or sooner PRN    Subjective      Adelina Mcneal is a 29 y.o. female who presents for IUD check s/p Kyleena exchange. The patient has no complaints today. The patient is sexually active - partner did not feel strings.    Menstrual History:  Nulligravida  Menarche age: 17  Patient's last menstrual period was 11/03/2024 (exact date).     Past Medical History:   Diagnosis Date    Allergic     Pollen, some pet dander       Family History   Problem Relation Age of Onset    No Known Problems Mother     No Known Problems Father     Substance Abuse Neg Hx     Mental illness Neg Hx     Alcohol abuse Neg Hx        Social History     Tobacco Use    Smoking status: Never    Smokeless tobacco: Never   Vaping Use    Vaping status: Never Used   Substance Use Topics    Alcohol use: Yes     Alcohol/week: 2.0 standard drinks of alcohol     Types: 2 Glasses of wine per week    Drug use: No       The following portions of the patient's history were reviewed and updated as appropriate: allergies, current medications, past family history, past medical history, past social history, past surgical history, and problem list.    Review of Systems  Pertinent items are noted in HPI.     Objective      /66 (BP Location: Right arm, Patient Position: Sitting, Cuff Size: Large)   Ht 5' 7.25\" (1.708 m)   Wt 91.2 kg (201 lb)   LMP 11/03/2024 (Exact Date) Comment: spotting only  BMI 31.25 kg/m²     General:   alert and oriented, in no acute distress   Heart: regular rate and rhythm   Lungs: Effort normal   Abdomen: nondistended   Vulva: normal   Vagina: normal mucosa   Cervix: no lesions and IUD strings seen       "